# Patient Record
Sex: MALE | Race: WHITE | NOT HISPANIC OR LATINO | URBAN - METROPOLITAN AREA
[De-identification: names, ages, dates, MRNs, and addresses within clinical notes are randomized per-mention and may not be internally consistent; named-entity substitution may affect disease eponyms.]

---

## 2021-04-19 ENCOUNTER — OFFICE VISIT (OUTPATIENT)
Dept: URBAN - METROPOLITAN AREA CLINIC 60 | Facility: CLINIC | Age: 46
End: 2021-04-19

## 2021-04-20 ENCOUNTER — LAB OUTSIDE AN ENCOUNTER (OUTPATIENT)
Dept: URBAN - METROPOLITAN AREA CLINIC 121 | Facility: CLINIC | Age: 46
End: 2021-04-20

## 2021-04-21 LAB — C-REACTIVE PROTEIN, QUANT: (no result)

## 2021-04-23 LAB
C DIFFICILE TOXIN GENE NAA: NEGATIVE
CALPROTECTIN, FECAL: (no result)
GIARDIA LAMBLIA AG, EIA: NEGATIVE
LACTOFERRIN, FECAL, QUANT.: (no result)
Lab: (no result)
OCCULT BLOOD, FECAL, IA: NEGATIVE
OVA + PARASITE EXAM: (no result)
SALMONELLA/SHIGELLA SCREEN: (no result)
WHITE BLOOD CELLS (WBC), STOOL: (no result)

## 2021-05-14 ENCOUNTER — OFFICE VISIT (OUTPATIENT)
Dept: URBAN - METROPOLITAN AREA SURGERY CENTER 4 | Facility: SURGERY CENTER | Age: 46
End: 2021-05-14

## 2021-05-19 LAB — PATHOLOGY (INDENTED REPORT): (no result)

## 2021-05-24 ENCOUNTER — OFFICE VISIT (OUTPATIENT)
Dept: URBAN - METROPOLITAN AREA CLINIC 60 | Facility: CLINIC | Age: 46
End: 2021-05-24

## 2021-07-19 ENCOUNTER — OFFICE VISIT (OUTPATIENT)
Dept: URBAN - METROPOLITAN AREA CLINIC 60 | Facility: CLINIC | Age: 46
End: 2021-07-19

## 2022-07-09 ENCOUNTER — TELEPHONE ENCOUNTER (OUTPATIENT)
Dept: URBAN - METROPOLITAN AREA CLINIC 121 | Facility: CLINIC | Age: 47
End: 2022-07-09

## 2022-07-09 RX ORDER — ATORVASTATIN CALCIUM 20 MG/1
TABLET, FILM COATED ORAL
Refills: 0 | OUTPATIENT
Start: 2021-03-14 | End: 2021-04-19

## 2022-07-09 RX ORDER — ERGOCALCIFEROL (VITAMIN D2) 10 MCG
TABLET ORAL
Refills: 0 | OUTPATIENT
Start: 2021-04-19 | End: 2021-05-24

## 2022-07-09 RX ORDER — CALCIUM CARBONATE 600 MG
TABLET ORAL
Refills: 0 | OUTPATIENT
Start: 2021-04-19 | End: 2021-05-24

## 2022-07-09 RX ORDER — HYDROCODONE BITARTRATE AND ACETAMINOPHEN 5; 325 MG/1; MG/1
TABLET ORAL
Refills: 0 | OUTPATIENT
Start: 2021-01-01 | End: 2021-04-19

## 2022-07-09 RX ORDER — FAMOTIDINE 40 MG/1
ONCE A DAY TABLET, FILM COATED ORAL ONCE A DAY
Refills: 0 | OUTPATIENT
Start: 2021-11-10 | End: 2021-12-13

## 2022-07-09 RX ORDER — FAMOTIDINE 40 MG/1
ONCE A DAY TABLET, FILM COATED ORAL ONCE A DAY
Refills: 1 | OUTPATIENT
Start: 2021-07-19 | End: 2021-11-10

## 2022-07-09 RX ORDER — OMEPRAZOLE MAGNESIUM 20 MG
ONCE A DAY 30MIN BEFORE BREAKFAST CAPSULE,DELAYED RELEASE (ENTERIC COATED) ORAL ONCE A DAY
Refills: 0 | OUTPATIENT
Start: 2021-05-24 | End: 2021-06-18

## 2022-07-09 RX ORDER — ATORVASTATIN CALCIUM 20 MG/1
TABLET, FILM COATED ORAL
Refills: 0 | OUTPATIENT
Start: 2021-04-19 | End: 2021-05-24

## 2022-07-10 ENCOUNTER — TELEPHONE ENCOUNTER (OUTPATIENT)
Dept: URBAN - METROPOLITAN AREA CLINIC 121 | Facility: CLINIC | Age: 47
End: 2022-07-10

## 2022-07-10 RX ORDER — ATORVASTATIN CALCIUM 20 MG/1
TABLET, FILM COATED ORAL ONCE A DAY
Refills: 0 | Status: ACTIVE | COMMUNITY
Start: 2021-05-24

## 2022-07-10 RX ORDER — FAMOTIDINE 40 MG/1
ONCE A DAY TABLET, FILM COATED ORAL ONCE A DAY
Refills: 1 | Status: ACTIVE | COMMUNITY
Start: 2021-07-19

## 2022-07-10 RX ORDER — LIDOCAINE 50 MG/G
TAKE AS DIRECTED CREAM TOPICAL TAKE AS DIRECTED
Refills: 1 | Status: ACTIVE | COMMUNITY
Start: 2021-05-24

## 2022-07-10 RX ORDER — LACTOBACIL 2/BIFIDO 1/S.THERMO 450B CELL
2 ONCE A DAY PACKET (EA) ORAL
Refills: 2 | Status: ACTIVE | COMMUNITY
Start: 2021-04-19

## 2022-07-10 RX ORDER — ERGOCALCIFEROL (VITAMIN D2) 10 MCG
TABLET ORAL ONCE A DAY
Refills: 0 | Status: ACTIVE | COMMUNITY
Start: 2021-05-24

## 2022-07-10 RX ORDER — OMEPRAZOLE 20 MG/1
ONCE A DAY CAPSULE, DELAYED RELEASE ORAL ONCE A DAY
Refills: 0 | Status: ACTIVE | COMMUNITY
Start: 2021-06-18

## 2022-07-10 RX ORDER — FAMOTIDINE 40 MG/1
TAKE 1 TABLET BY MOUTH EVERY DAY TABLET, FILM COATED ORAL
Refills: 1 | Status: ACTIVE | COMMUNITY
Start: 2021-12-13

## 2022-07-10 RX ORDER — CALCIUM CARBONATE 600 MG
TABLET ORAL ONCE A DAY
Refills: 0 | Status: ACTIVE | COMMUNITY
Start: 2021-05-24

## 2022-10-28 ENCOUNTER — TELEPHONE ENCOUNTER (OUTPATIENT)
Dept: URBAN - METROPOLITAN AREA CLINIC 63 | Facility: CLINIC | Age: 47
End: 2022-10-28

## 2022-11-03 ENCOUNTER — LAB OUTSIDE AN ENCOUNTER (OUTPATIENT)
Dept: URBAN - METROPOLITAN AREA CLINIC 63 | Facility: CLINIC | Age: 47
End: 2022-11-03

## 2022-11-03 ENCOUNTER — WEB ENCOUNTER (OUTPATIENT)
Dept: URBAN - METROPOLITAN AREA CLINIC 63 | Facility: CLINIC | Age: 47
End: 2022-11-03

## 2022-11-03 ENCOUNTER — OFFICE VISIT (OUTPATIENT)
Dept: URBAN - METROPOLITAN AREA CLINIC 63 | Facility: CLINIC | Age: 47
End: 2022-11-03
Payer: COMMERCIAL

## 2022-11-03 VITALS
HEIGHT: 63 IN | DIASTOLIC BLOOD PRESSURE: 70 MMHG | BODY MASS INDEX: 30.65 KG/M2 | HEART RATE: 68 BPM | OXYGEN SATURATION: 98 % | SYSTOLIC BLOOD PRESSURE: 120 MMHG | TEMPERATURE: 97.3 F | WEIGHT: 173 LBS

## 2022-11-03 DIAGNOSIS — Z87.19 HISTORY OF ULCERATIVE COLITIS: ICD-10-CM

## 2022-11-03 DIAGNOSIS — K20.0 EOSINOPHILIC ESOPHAGITIS: ICD-10-CM

## 2022-11-03 PROCEDURE — 99213 OFFICE O/P EST LOW 20 MIN: CPT | Performed by: INTERNAL MEDICINE

## 2022-11-03 RX ORDER — LIDOCAINE 50 MG/G
TAKE AS DIRECTED CREAM TOPICAL TAKE AS DIRECTED
Refills: 1 | Status: ACTIVE | COMMUNITY
Start: 2021-05-24

## 2022-11-03 RX ORDER — CALCIUM CARBONATE 600 MG
TABLET ORAL ONCE A DAY
Refills: 0 | Status: ACTIVE | COMMUNITY
Start: 2021-05-24

## 2022-11-03 RX ORDER — LORATADINE 10 MG
1 PACKET MIXED WITH 8 OUNCES OF FLUID TABLET,DISINTEGRATING ORAL ONCE A DAY
Status: ACTIVE | COMMUNITY

## 2022-11-03 RX ORDER — PANTOPRAZOLE SODIUM 40 MG/1
1 TABLET TABLET, DELAYED RELEASE ORAL ONCE A DAY
Status: ACTIVE | COMMUNITY

## 2022-11-03 RX ORDER — LACTOBACIL 2/BIFIDO 1/S.THERMO 450B CELL
2 ONCE A DAY PACKET (EA) ORAL
Refills: 2 | Status: ACTIVE | COMMUNITY
Start: 2021-04-19

## 2022-11-03 RX ORDER — ATORVASTATIN CALCIUM 20 MG/1
TABLET, FILM COATED ORAL ONCE A DAY
Refills: 0 | Status: ACTIVE | COMMUNITY
Start: 2021-05-24

## 2022-11-03 RX ORDER — ERGOCALCIFEROL (VITAMIN D2) 10 MCG
TABLET ORAL ONCE A DAY
Refills: 0 | Status: ACTIVE | COMMUNITY
Start: 2021-05-24

## 2022-11-03 RX ORDER — ONDANSETRON 4 MG/1
1 TABLET ON THE TONGUE AND ALLOW TO DISSOLVE TABLET, ORALLY DISINTEGRATING ORAL ONCE A DAY
Status: ACTIVE | COMMUNITY

## 2022-11-03 NOTE — HPI-TODAY'S VISIT:
Anjel is a pleasant 47-year-old male who presents today for follow-up.  History of UC and pouchitis. Previous use of mesalamine, steroid enemas and prednisone.  Developed cushingoid disease.  Previously on azathioprine which eventually caused acute pancreatitis.  Status post restorative pan-proctocolectomy and W pouch.  Reversal of ileostomy. Last visit we recommended elimination diet and proceeding with Pepcid 40 mg daily.Patient underwent repair of incarcerated incisional hernia on 10/1/2022 with Dr. Narayanan.  Presented to the ER on 10/23/2022 with abdominal pain.  Associated constipation.  Evaluated by Dr. Narayanan for suspected ileus secondary to postop anesthesia.  KUB consistent with ileus.  Patient given gentle soapsuds enemas.  Evaluated as well by [facility].  Patient was taken Percocet every 6 hours with minimal relief of his abdominal pain.  Digital rectal exam unrevealing for impaction.  Attempts at NG tube placement failed with patient refusing reattempt.  Patient was given fleets enema and Relistor injection with consideration of flexible pouchoscopy with decompression pending clinical course.  In addition patient was given Reglan as prokinetic agent patient was discharged on Movantik, Metamucil, probiotics and MiraLAX once daily upon discharge  Labs dated 10/24/2022 showed a normal CBC.  PT/INR normal.  Normal CMP.  KUB dated 10/26/2022 showed small bowel distention mildly improved as compared to the prior examination with posterior distal gas suspicious for small bowel obstruction.  Suggestion of small bowel wall thickening in the right mid abdomen  CT abdomen/pelvis with contrast dated 10/23/2022 showed mild nonspecific fat stranding seen within the anterior abdomen along the anterior abdominal wall without evidence of bowel wall thickening or organized fluid collection.  Trace free intraperitoneal gas in the anterior abdomen which may be secondary to recent surgery.  EGD dated 5/14/2021 showed a regular Z-line.  White specked mucosa in the esophagus concerning for eosinophilic microabscesses.  Linear furrows noted in in upper esophagus with rings.  Gastritis.  Duodenitis.  Small hiatal hernia.  Normal second portion and third portion of the duodenum. Pathology demonstrated benign duodenal biopsies.  Body/antrum biopsies negative for H. pylori.  Lower third esophageal biopsies revealed increased intraepithelial eosinophils up to 80 per high-power field.  Upper third esophageal biopsies revealed increased intraepithelial eosinophils up to 40 per high-power field.  Otherwise unremarkable or benign.  Flexible sigmoidoscopy dated 5/14/2021 showed normal rectal pouch.  Nonbleeding internal hemorrhoids. Rectal pouch biopsies revealed fragments of benign intestinal type mucosa with focal mild acute cryptitis no evidence of granulomas or dysplasia  DEXA scan dated 5/7/21 demonstrated osteopenia in the lumbar spine and left femoral neck. Left total hip is normal  Patient states abd cramping has improved with 5-6 loose stools per day.  States has no urgency to have BM .  Patient was started on protonix 40mg daily for heartburn in hospital.

## 2022-11-29 ENCOUNTER — WEB ENCOUNTER (OUTPATIENT)
Dept: URBAN - METROPOLITAN AREA SURGERY CENTER 4 | Facility: SURGERY CENTER | Age: 47
End: 2022-11-29

## 2022-12-02 ENCOUNTER — CLAIMS CREATED FROM THE CLAIM WINDOW (OUTPATIENT)
Dept: URBAN - METROPOLITAN AREA CLINIC 4 | Facility: CLINIC | Age: 47
End: 2022-12-02
Payer: COMMERCIAL

## 2022-12-02 ENCOUNTER — OFFICE VISIT (OUTPATIENT)
Dept: URBAN - METROPOLITAN AREA SURGERY CENTER 4 | Facility: SURGERY CENTER | Age: 47
End: 2022-12-02
Payer: COMMERCIAL

## 2022-12-02 DIAGNOSIS — K31.89 OTHER DISEASES OF STOMACH AND DUODENUM: ICD-10-CM

## 2022-12-02 DIAGNOSIS — K20.0 EOSINOPHILIC ESOPHAGITIS: ICD-10-CM

## 2022-12-02 DIAGNOSIS — K22.9 DISORDER OF ESOPHAGUS: ICD-10-CM

## 2022-12-02 DIAGNOSIS — K63.89 OTHER SPECIFIED DISEASES OF INTESTINE: ICD-10-CM

## 2022-12-02 DIAGNOSIS — K52.3 INDETERMINATE COLITIS: ICD-10-CM

## 2022-12-02 DIAGNOSIS — K64.0 GRADE I INTERNAL HEMORRHOIDS: ICD-10-CM

## 2022-12-02 DIAGNOSIS — K29.70 GASTRITIS: ICD-10-CM

## 2022-12-02 PROCEDURE — 43239 EGD BIOPSY SINGLE/MULTIPLE: CPT | Performed by: INTERNAL MEDICINE

## 2022-12-02 PROCEDURE — 88312 SPECIAL STAINS GROUP 1: CPT | Performed by: PATHOLOGY

## 2022-12-02 PROCEDURE — 88305 TISSUE EXAM BY PATHOLOGIST: CPT | Performed by: PATHOLOGY

## 2022-12-02 PROCEDURE — 88342 IMHCHEM/IMCYTCHM 1ST ANTB: CPT | Performed by: PATHOLOGY

## 2022-12-02 PROCEDURE — 45331 SIGMOIDOSCOPY AND BIOPSY: CPT | Performed by: INTERNAL MEDICINE

## 2022-12-02 RX ORDER — LACTOBACIL 2/BIFIDO 1/S.THERMO 450B CELL
2 ONCE A DAY PACKET (EA) ORAL
Refills: 2 | Status: ACTIVE | COMMUNITY
Start: 2021-04-19

## 2022-12-02 RX ORDER — ATORVASTATIN CALCIUM 20 MG/1
TABLET, FILM COATED ORAL ONCE A DAY
Refills: 0 | Status: ACTIVE | COMMUNITY
Start: 2021-05-24

## 2022-12-02 RX ORDER — LIDOCAINE 50 MG/G
TAKE AS DIRECTED CREAM TOPICAL TAKE AS DIRECTED
Refills: 1 | Status: ACTIVE | COMMUNITY
Start: 2021-05-24

## 2022-12-02 RX ORDER — PANTOPRAZOLE SODIUM 40 MG/1
1 TABLET TABLET, DELAYED RELEASE ORAL ONCE A DAY
Status: ACTIVE | COMMUNITY

## 2022-12-02 RX ORDER — ERGOCALCIFEROL (VITAMIN D2) 10 MCG
TABLET ORAL ONCE A DAY
Refills: 0 | Status: ACTIVE | COMMUNITY
Start: 2021-05-24

## 2022-12-02 RX ORDER — ONDANSETRON 4 MG/1
1 TABLET ON THE TONGUE AND ALLOW TO DISSOLVE TABLET, ORALLY DISINTEGRATING ORAL ONCE A DAY
Status: ACTIVE | COMMUNITY

## 2022-12-02 RX ORDER — CALCIUM CARBONATE 600 MG
TABLET ORAL ONCE A DAY
Refills: 0 | Status: ACTIVE | COMMUNITY
Start: 2021-05-24

## 2022-12-02 RX ORDER — LORATADINE 10 MG
1 PACKET MIXED WITH 8 OUNCES OF FLUID TABLET,DISINTEGRATING ORAL ONCE A DAY
Status: ACTIVE | COMMUNITY

## 2022-12-06 PROBLEM — 235746007 INDETERMINATE COLITIS: Status: ACTIVE | Noted: 2022-12-06

## 2022-12-06 PROBLEM — 4556007 GASTRITIS: Status: ACTIVE | Noted: 2022-12-06

## 2022-12-21 ENCOUNTER — OFFICE VISIT (OUTPATIENT)
Dept: URBAN - METROPOLITAN AREA CLINIC 63 | Facility: CLINIC | Age: 47
End: 2022-12-21
Payer: COMMERCIAL

## 2022-12-21 ENCOUNTER — LAB OUTSIDE AN ENCOUNTER (OUTPATIENT)
Dept: URBAN - METROPOLITAN AREA CLINIC 63 | Facility: CLINIC | Age: 47
End: 2022-12-21

## 2022-12-21 ENCOUNTER — ERX REFILL RESPONSE (OUTPATIENT)
Dept: URBAN - METROPOLITAN AREA CLINIC 63 | Facility: CLINIC | Age: 47
End: 2022-12-21

## 2022-12-21 VITALS
TEMPERATURE: 98.6 F | HEIGHT: 63 IN | OXYGEN SATURATION: 98 % | DIASTOLIC BLOOD PRESSURE: 70 MMHG | BODY MASS INDEX: 31.29 KG/M2 | HEART RATE: 70 BPM | SYSTOLIC BLOOD PRESSURE: 124 MMHG | WEIGHT: 176.6 LBS

## 2022-12-21 DIAGNOSIS — K20.0 EOSINOPHILIC ESOPHAGITIS: ICD-10-CM

## 2022-12-21 DIAGNOSIS — K91.850 POUCHITIS: ICD-10-CM

## 2022-12-21 DIAGNOSIS — Z87.19 HISTORY OF ULCERATIVE COLITIS: ICD-10-CM

## 2022-12-21 PROBLEM — 235599003: Status: ACTIVE | Noted: 2022-11-03

## 2022-12-21 PROCEDURE — 99213 OFFICE O/P EST LOW 20 MIN: CPT | Performed by: INTERNAL MEDICINE

## 2022-12-21 RX ORDER — ERGOCALCIFEROL (VITAMIN D2) 10 MCG
TABLET ORAL ONCE A DAY
Refills: 0 | Status: ACTIVE | COMMUNITY
Start: 2021-05-24

## 2022-12-21 RX ORDER — MESALAMINE 1000 MG/1
1 SUPPOSITORY AT BEDTIME SUPPOSITORY RECTAL ONCE A DAY
Qty: 30 | Refills: 2 | OUTPATIENT
Start: 2022-12-21 | End: 2023-03-21

## 2022-12-21 RX ORDER — ATORVASTATIN CALCIUM 20 MG/1
TABLET, FILM COATED ORAL ONCE A DAY
Refills: 0 | Status: ACTIVE | COMMUNITY
Start: 2021-05-24

## 2022-12-21 RX ORDER — PANTOPRAZOLE SODIUM 40 MG/1
1 TABLET TABLET, DELAYED RELEASE ORAL ONCE A DAY
Status: ACTIVE | COMMUNITY

## 2022-12-21 RX ORDER — PANTOPRAZOLE SODIUM 20 MG/1
TAKE 1 TABLET BY MOUTH EVERY DAY TABLET, DELAYED RELEASE ORAL
Qty: 90 TABLET | Refills: 0 | OUTPATIENT

## 2022-12-21 RX ORDER — CALCIUM CARBONATE 600 MG
TABLET ORAL ONCE A DAY
Refills: 0 | Status: ACTIVE | COMMUNITY
Start: 2021-05-24

## 2022-12-21 RX ORDER — PANTOPRAZOLE SODIUM 20 MG/1
1 TABLET TABLET, DELAYED RELEASE ORAL ONCE A DAY
Qty: 30 | Refills: 0 | OUTPATIENT

## 2022-12-21 RX ORDER — MESALAMINE 1.2 G/1
3TABS DAILY TABLET, DELAYED RELEASE ORAL ONCE A DAY
Qty: 90 TABLET | Refills: 2 | OUTPATIENT
Start: 2022-12-21 | End: 2023-03-21

## 2022-12-21 RX ORDER — LIDOCAINE 50 MG/G
TAKE AS DIRECTED CREAM TOPICAL TAKE AS DIRECTED
Refills: 1 | Status: ACTIVE | COMMUNITY
Start: 2021-05-24

## 2022-12-21 RX ORDER — LACTOBACIL 2/BIFIDO 1/S.THERMO 450B CELL
2 ONCE A DAY PACKET (EA) ORAL
Refills: 2 | Status: ACTIVE | COMMUNITY
Start: 2021-04-19

## 2022-12-21 RX ORDER — PANTOPRAZOLE SODIUM 20 MG/1
1 TABLET TABLET, DELAYED RELEASE ORAL ONCE A DAY
Qty: 30 | Refills: 0 | OUTPATIENT
Start: 2022-12-21

## 2022-12-21 NOTE — HPI-TODAY'S VISIT:
Anjel is a pleasant 47-year-old male who presents today for a procedure follow-up.  History of UC and pouchitis. Previous use of mesalamine, steroid enemas and prednisone.  Developed cushingoid disease.  Previously on azathioprine which eventually caused acute pancreatitis.  Status post restorative pan-proctocolectomy and W pouch.  Reversal of ileostomy. Patient underwent repair of incarcerated incisional hernia on 10/21/2022 with Dr. Narayanan.  Patient had noted abdominal cramping had improved with 5-6 loose stools per day last visit.  No fecal urgency.  Started on Protonix 40 mg daily for heartburn while he was in the hospital.  Recommend he continue MiraLAX daily, probiotic and Citrucel daily.  Recommend he follow-up with PCP regarding bone density.  In addition it was recommended he continue PPI and elimination diet  EGD dated 12/2/2022 showed a regular Z-line.  Esophageal plaques were found consistent with candidiasis.  Gastritis.  Normal duodenum.  Flex sig dated 12/2/2022 showed 2 nodules found in the rectal pouch.  Biopsies taken.  Appears more consistent with accessory fold versus external protrusion.  Pouch otherwise normal.  Afferent ileal limb normal.  Nonbleeding internal hemorrhoids.  Repeat flexible sigmoidoscopy in 1 year for surveillance.  Consider MRI pelvis as outpatient.  PATH :Chronic active ileitis of pouch and EOE; No evidence of Barretts, or HPylori Labs dated 10/24/2022 showed a normal CBC.  PT/INR normal.  Normal CMP.  KUB dated 10/26/2022 showed small bowel distention mildly improved as compared to the prior examination with posterior distal gas suspicious for small bowel obstruction.  Suggestion of small bowel wall thickening in the right mid abdomen  CT abdomen/pelvis with contrast dated 10/23/2022 showed mild nonspecific fat stranding seen within the anterior abdomen along the anterior abdominal wall without evidence of bowel wall thickening or organized fluid collection.  Trace free intraperitoneal gas in the anterior abdomen which may be secondary to recent surgery.  EGD dated 5/14/2021 showed a regular Z-line.  White specked mucosa in the esophagus concerning for eosinophilic microabscesses.  Linear furrows noted in in upper esophagus with rings.  Gastritis.  Duodenitis.  Small hiatal hernia.  Normal second portion and third portion of the duodenum. Pathology demonstrated benign duodenal biopsies.  Body/antrum biopsies negative for H. pylori.  Lower third esophageal biopsies revealed increased intraepithelial eosinophils up to 80 per high-power field.  Upper third esophageal biopsies revealed increased intraepithelial eosinophils up to 40 per high-power field.  Otherwise unremarkable or benign.  Flexible sigmoidoscopy dated 5/14/2021 showed normal rectal pouch.  Nonbleeding internal hemorrhoids. Rectal pouch biopsies revealed fragments of benign intestinal type mucosa with focal mild acute cryptitis no evidence of granulomas or dysplasia  DEXA scan dated 5/7/21 demonstrated osteopenia in the lumbar spine and left femoral neck. Left total hip is normal  Patient feeling well denies any abd pain, rectal bleeding. hearburn, dysphagia or wt loss. Occasional straining with BM using miralax

## 2022-12-28 ENCOUNTER — TELEPHONE ENCOUNTER (OUTPATIENT)
Dept: URBAN - METROPOLITAN AREA CLINIC 23 | Facility: CLINIC | Age: 47
End: 2022-12-28

## 2023-01-06 ENCOUNTER — TELEPHONE ENCOUNTER (OUTPATIENT)
Dept: URBAN - METROPOLITAN AREA CLINIC 63 | Facility: CLINIC | Age: 48
End: 2023-01-06

## 2023-02-02 ENCOUNTER — TELEPHONE ENCOUNTER (OUTPATIENT)
Dept: URBAN - METROPOLITAN AREA CLINIC 63 | Facility: CLINIC | Age: 48
End: 2023-02-02

## 2023-03-27 ENCOUNTER — OFFICE VISIT (OUTPATIENT)
Dept: URBAN - METROPOLITAN AREA CLINIC 60 | Facility: CLINIC | Age: 48
End: 2023-03-27

## 2023-04-03 ENCOUNTER — OFFICE VISIT (OUTPATIENT)
Dept: URBAN - METROPOLITAN AREA CLINIC 60 | Facility: CLINIC | Age: 48
End: 2023-04-03
Payer: COMMERCIAL

## 2023-04-03 ENCOUNTER — LAB OUTSIDE AN ENCOUNTER (OUTPATIENT)
Dept: URBAN - METROPOLITAN AREA CLINIC 60 | Facility: CLINIC | Age: 48
End: 2023-04-03

## 2023-04-03 VITALS
TEMPERATURE: 98.1 F | HEART RATE: 81 BPM | OXYGEN SATURATION: 99 % | DIASTOLIC BLOOD PRESSURE: 76 MMHG | HEIGHT: 63 IN | SYSTOLIC BLOOD PRESSURE: 126 MMHG | WEIGHT: 181 LBS | BODY MASS INDEX: 32.07 KG/M2

## 2023-04-03 DIAGNOSIS — K45.8 OTHER SPECIFIED ABDOMINAL HERNIA WITHOUT OBSTRUCTION OR GANGRENE: ICD-10-CM

## 2023-04-03 DIAGNOSIS — K20.0 EOSINOPHILIC ESOPHAGITIS: ICD-10-CM

## 2023-04-03 DIAGNOSIS — K91.850 POUCHITIS: ICD-10-CM

## 2023-04-03 DIAGNOSIS — Z87.19 HISTORY OF ULCERATIVE COLITIS: ICD-10-CM

## 2023-04-03 PROCEDURE — 99213 OFFICE O/P EST LOW 20 MIN: CPT | Performed by: INTERNAL MEDICINE

## 2023-04-03 RX ORDER — PANTOPRAZOLE SODIUM 40 MG/1
1 TABLET TABLET, DELAYED RELEASE ORAL ONCE A DAY
Status: ACTIVE | COMMUNITY

## 2023-04-03 RX ORDER — ERGOCALCIFEROL (VITAMIN D2) 10 MCG
TABLET ORAL ONCE A DAY
Refills: 0 | Status: ACTIVE | COMMUNITY
Start: 2021-05-24

## 2023-04-03 RX ORDER — LIDOCAINE 50 MG/G
TAKE AS DIRECTED CREAM TOPICAL TAKE AS DIRECTED
Refills: 1 | Status: ACTIVE | COMMUNITY
Start: 2021-05-24

## 2023-04-03 RX ORDER — CALCIUM CARBONATE 600 MG
TABLET ORAL ONCE A DAY
Refills: 0 | Status: ACTIVE | COMMUNITY
Start: 2021-05-24

## 2023-04-03 RX ORDER — LACTOBACIL 2/BIFIDO 1/S.THERMO 450B CELL
2 ONCE A DAY PACKET (EA) ORAL
Refills: 2 | Status: ACTIVE | COMMUNITY
Start: 2021-04-19

## 2023-04-03 RX ORDER — ONDANSETRON 4 MG/1
1 TABLET ON THE TONGUE AND ALLOW TO DISSOLVE TABLET, ORALLY DISINTEGRATING ORAL ONCE A DAY
Status: ACTIVE | COMMUNITY

## 2023-04-03 RX ORDER — ATORVASTATIN CALCIUM 20 MG/1
TABLET, FILM COATED ORAL ONCE A DAY
Refills: 0 | Status: ACTIVE | COMMUNITY
Start: 2021-05-24

## 2023-04-03 NOTE — HPI-TODAY'S VISIT:
Anjel is a pleasant 47-year-old male who presents today for a follow-up.  History of UC and pouchitis. Developed cushingoid disease previously.  Previously on azathioprine which eventually caused acute pancreatitis.  Status post restorative pan-proctocolectomy and W pouch.  Reversal of ileostomy. Patient underwent repair of incarcerated incisional hernia on 10/21/2022 with Dr. Narayanan.  Last visit noted occasional straining with bowel movements using MiraLAX. Maintained on Lialda 3.6 g daily and mesalamine suppositories nightly.  Decreased to pantoprazole 20 mg once daily  MRI pelvis with and without contrast and December 2022 showed postsurgical changes of colectomy and pouch creation.  No evidence of acute pouchitis.  Probable internal hernia involving a 15 to 20 cm segment of borderline dilated small bowel approximately 10 cm proximal to the pouch.  Small bowel proximally is nondilated.  The fat stranding described on the 10/23/2022 CT performed at [facility] was within the corresponding small bowel mesentery and has since resolved  EGD dated 12/2/2022 showed a regular Z-line.  Esophageal plaques were found consistent with candidiasis.  Gastritis.  Normal duodenum. No significant antrum/body abnormality.  Esophageal biopsy showed squamous mucosa with marked basal cell hyperplasia and numerous intraepithelial eosinophils, ranging from 40 to 60 per high-power field.  Consistent with EOE.  No evidence of infectious organisms or Danielle's esophagus  Flex sig dated 12/2/2022 showed 2 nodules found in the rectal pouch.  Biopsies taken.  Appears more consistent with accessory fold versus external protrusion.  Pouch otherwise normal.  Afferent ileal limb normal.  Nonbleeding internal hemorrhoids.  Repeat flexible sigmoidoscopy in 1 year for surveillance.  Consider MRI pelvis as outpatient. 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  Negative for infectious organisms, dysplasia or malignancy.  Prominent mucosal lymphoid aggregates.  Negative for dysplasia or malignancy.  Labs dated 10/24/2022 showed a normal CBC.  PT/INR normal.  Normal CMP.  KUB dated 10/26/2022 showed small bowel distention mildly improved as compared to the prior examination with posterior distal gas suspicious for small bowel obstruction.  Suggestion of small bowel wall thickening in the right mid abdomen  CT abdomen/pelvis with contrast dated 10/23/2022 showed mild nonspecific fat stranding seen within the anterior abdomen along the anterior abdominal wall without evidence of bowel wall thickening or organized fluid collection.  Trace free intraperitoneal gas in the anterior abdomen which may be secondary to recent surgery.  DEXA scan dated 5/7/21 demonstrated osteopenia in the lumbar spine and left femoral neck. Left total hip is normal  Patient recently seen by DR [facility] where he had pouchgram which was normal. Recommended MRE. Taking mesalamine 3.6 gm and supp daily.  Takes  probiotic daily.  Occasional rectal bleeding felt to be hemorrhoidal.   DEnies rectal pain , heartburn, dysphagia or wt loss Still taking protonix 20mg daily .

## 2023-04-14 ENCOUNTER — ERX REFILL RESPONSE (OUTPATIENT)
Dept: URBAN - METROPOLITAN AREA CLINIC 63 | Facility: CLINIC | Age: 48
End: 2023-04-14

## 2023-04-14 RX ORDER — PANTOPRAZOLE 20 MG/1
TAKE 1 TABLET BY MOUTH EVERY DAY TABLET, DELAYED RELEASE ORAL
Qty: 90 TABLET | Refills: 0 | OUTPATIENT

## 2023-04-14 RX ORDER — MESALAMINE 1000 MG/1
UNWRAP AND INSERT 1 SUPPOSITORY RECTALLY EVERY DAY AT BEDTIME SUPPOSITORY RECTAL
Qty: 30 SUPPOSITORY | Refills: 0 | OUTPATIENT

## 2023-04-19 ENCOUNTER — TELEPHONE ENCOUNTER (OUTPATIENT)
Dept: URBAN - METROPOLITAN AREA CLINIC 60 | Facility: CLINIC | Age: 48
End: 2023-04-19

## 2023-04-19 RX ORDER — MESALAMINE 1.2 G/1
4 TABLETS WITH MEAL TABLET, DELAYED RELEASE ORAL ONCE A DAY
Qty: 120 TABLET | Refills: 2 | OUTPATIENT
Start: 2023-04-20 | End: 2023-07-19

## 2023-04-19 RX ORDER — LIDOCAINE 50 MG/G
TAKE AS DIRECTED CREAM TOPICAL TAKE AS DIRECTED
Refills: 1 | Status: ACTIVE | COMMUNITY
Start: 2021-05-24

## 2023-04-19 RX ORDER — PANTOPRAZOLE 20 MG/1
TAKE 1 TABLET BY MOUTH EVERY DAY TABLET, DELAYED RELEASE ORAL
Qty: 90 TABLET | Refills: 0 | Status: ACTIVE | COMMUNITY

## 2023-04-19 RX ORDER — PANTOPRAZOLE SODIUM 40 MG/1
1 TABLET TABLET, DELAYED RELEASE ORAL ONCE A DAY
Status: ACTIVE | COMMUNITY

## 2023-04-19 RX ORDER — LACTOBACIL 2/BIFIDO 1/S.THERMO 450B CELL
2 ONCE A DAY PACKET (EA) ORAL
Refills: 2 | Status: ACTIVE | COMMUNITY
Start: 2021-04-19

## 2023-04-19 RX ORDER — ATORVASTATIN CALCIUM 20 MG/1
TABLET, FILM COATED ORAL ONCE A DAY
Refills: 0 | Status: ACTIVE | COMMUNITY
Start: 2021-05-24

## 2023-04-19 RX ORDER — CALCIUM CARBONATE 600 MG
TABLET ORAL ONCE A DAY
Refills: 0 | Status: ACTIVE | COMMUNITY
Start: 2021-05-24

## 2023-04-19 RX ORDER — ERGOCALCIFEROL (VITAMIN D2) 10 MCG
TABLET ORAL ONCE A DAY
Refills: 0 | Status: ACTIVE | COMMUNITY
Start: 2021-05-24

## 2023-04-19 RX ORDER — ONDANSETRON 4 MG/1
1 TABLET ON THE TONGUE AND ALLOW TO DISSOLVE TABLET, ORALLY DISINTEGRATING ORAL ONCE A DAY
Status: ACTIVE | COMMUNITY

## 2023-04-19 RX ORDER — MESALAMINE 1000 MG/1
UNWRAP AND INSERT 1 SUPPOSITORY RECTALLY EVERY DAY AT BEDTIME SUPPOSITORY RECTAL
Qty: 30 SUPPOSITORY | Refills: 0 | Status: ACTIVE | COMMUNITY

## 2023-04-20 ENCOUNTER — TELEPHONE ENCOUNTER (OUTPATIENT)
Dept: URBAN - METROPOLITAN AREA CLINIC 63 | Facility: CLINIC | Age: 48
End: 2023-04-20

## 2023-06-26 ENCOUNTER — OFFICE VISIT (OUTPATIENT)
Dept: URBAN - METROPOLITAN AREA CLINIC 60 | Facility: CLINIC | Age: 48
End: 2023-06-26

## 2023-08-01 ENCOUNTER — ERX REFILL RESPONSE (OUTPATIENT)
Dept: URBAN - METROPOLITAN AREA CLINIC 60 | Facility: CLINIC | Age: 48
End: 2023-08-01

## 2023-08-01 RX ORDER — MESALAMINE 1.2 G/1
TAKE 4 TABLETS BY MOUTH EVERY DAY WITH MEAL TABLET, DELAYED RELEASE ORAL
Qty: 120 TABLET | Refills: 0 | OUTPATIENT

## 2023-08-15 ENCOUNTER — ERX REFILL RESPONSE (OUTPATIENT)
Dept: URBAN - METROPOLITAN AREA CLINIC 63 | Facility: CLINIC | Age: 48
End: 2023-08-15

## 2023-08-15 RX ORDER — PANTOPRAZOLE 20 MG/1
TAKE 1 TABLET BY MOUTH EVERY DAY TABLET, DELAYED RELEASE ORAL
Qty: 90 TABLET | Refills: 0 | OUTPATIENT

## 2023-11-10 ENCOUNTER — ERX REFILL RESPONSE (OUTPATIENT)
Dept: URBAN - METROPOLITAN AREA CLINIC 63 | Facility: CLINIC | Age: 48
End: 2023-11-10

## 2023-11-10 RX ORDER — PANTOPRAZOLE 20 MG/1
TAKE 1 TABLET BY MOUTH EVERY DAY TABLET, DELAYED RELEASE ORAL
Qty: 90 TABLET | Refills: 0 | OUTPATIENT

## 2023-11-22 ENCOUNTER — OFFICE VISIT (OUTPATIENT)
Dept: URBAN - METROPOLITAN AREA CLINIC 63 | Facility: CLINIC | Age: 48
End: 2023-11-22
Payer: COMMERCIAL

## 2023-11-22 ENCOUNTER — LAB OUTSIDE AN ENCOUNTER (OUTPATIENT)
Dept: URBAN - METROPOLITAN AREA CLINIC 63 | Facility: CLINIC | Age: 48
End: 2023-11-22

## 2023-11-22 VITALS
WEIGHT: 173 LBS | BODY MASS INDEX: 30.65 KG/M2 | HEIGHT: 63 IN | DIASTOLIC BLOOD PRESSURE: 80 MMHG | OXYGEN SATURATION: 97 % | SYSTOLIC BLOOD PRESSURE: 120 MMHG | TEMPERATURE: 97 F | RESPIRATION RATE: 20 BRPM | HEART RATE: 71 BPM

## 2023-11-22 DIAGNOSIS — K45.8 OTHER SPECIFIED ABDOMINAL HERNIA WITHOUT OBSTRUCTION OR GANGRENE: ICD-10-CM

## 2023-11-22 DIAGNOSIS — K20.0 EOSINOPHILIC ESOPHAGITIS: ICD-10-CM

## 2023-11-22 DIAGNOSIS — Z87.19 HISTORY OF ULCERATIVE COLITIS: ICD-10-CM

## 2023-11-22 PROCEDURE — 99214 OFFICE O/P EST MOD 30 MIN: CPT | Performed by: INTERNAL MEDICINE

## 2023-11-22 RX ORDER — CALCIUM CARBONATE 600 MG
TABLET ORAL ONCE A DAY
Refills: 0 | Status: ACTIVE | COMMUNITY
Start: 2021-05-24

## 2023-11-22 RX ORDER — LIDOCAINE 50 MG/G
TAKE AS DIRECTED CREAM TOPICAL TAKE AS DIRECTED
Refills: 1 | Status: ACTIVE | COMMUNITY
Start: 2021-05-24

## 2023-11-22 RX ORDER — ATORVASTATIN CALCIUM 20 MG/1
TABLET, FILM COATED ORAL ONCE A DAY
Refills: 0 | Status: ACTIVE | COMMUNITY
Start: 2021-05-24

## 2023-11-22 RX ORDER — ERGOCALCIFEROL (VITAMIN D2) 10 MCG
TABLET ORAL ONCE A DAY
Refills: 0 | Status: ACTIVE | COMMUNITY
Start: 2021-05-24

## 2023-11-22 RX ORDER — MESALAMINE 1.2 G/1
TAKE 4 TABLETS BY MOUTH EVERY DAY WITH MEAL TABLET, DELAYED RELEASE ORAL
Qty: 120 TABLET | Refills: 0 | Status: ACTIVE | COMMUNITY

## 2023-11-22 RX ORDER — PANTOPRAZOLE 20 MG/1
TAKE 1 TABLET BY MOUTH EVERY DAY TABLET, DELAYED RELEASE ORAL
Qty: 90 TABLET | Refills: 0 | Status: ACTIVE | COMMUNITY

## 2023-11-22 RX ORDER — PANTOPRAZOLE SODIUM 40 MG/1
1 TABLET TABLET, DELAYED RELEASE ORAL ONCE A DAY
Status: ACTIVE | COMMUNITY

## 2023-11-22 NOTE — HPI-TODAY'S VISIT:
Anjel is a pleasant 48-year-old male who presents today for a follow-up.  History of UC and pouchitis. Developed cushingoid disease previously.  Previously on azathioprine which eventually caused acute pancreatitis.  Status post restorative pan-proctocolectomy and W pouch.  Reversal of ileostomy.  Patient underwent repair of incarcerated incisional hernia on 10/21/2022 with Dr. Narayanan.   Maintained on Lialda 2.4gm  daily and mesalamine suppositories nightly. Patient seen by the [facility] where he had pouchogram which was normal.  Recommended MR enterography.  Taking daily probiotic.   Occasional rectal bleeding felt to be hemorrhoidal.  Still taking Protonix 20 mg daily.   MRI pelvis with and without contrast and December 2022 showed postsurgical changes of colectomy and pouch creation.  No evidence of acute pouchitis.  Probable internal hernia involving a 15 to 20 cm segment of borderline dilated small bowel approximately 10 cm proximal to the pouch.  Small bowel proximally is nondilated.  The fat stranding described on the 10/23/2022 CT performed at [facility] was within the corresponding small bowel mesentery and has since resolved  EGD dated 12/2/2022 showed a regular Z-line.  Esophageal plaques were found consistent with candidiasis.  Gastritis.  Normal duodenum. No significant antrum/body abnormality.  Esophageal biopsy showed squamous mucosa with marked basal cell hyperplasia and numerous intraepithelial eosinophils, ranging from 40 to 60 per high-power field.  Consistent with EOE.  No evidence of infectious organisms or Danielle's esophagus  Flex sig dated 12/2/2022 showed 2 nodules found in the rectal pouch.  Biopsies taken.  Appears more consistent with accessory fold versus external protrusion.  Pouch otherwise normal.  Afferent ileal limb normal.  Nonbleeding internal hemorrhoids.  Repeat flexible sigmoidoscopy in 1 year for surveillance.  Consider MRI pelvis as outpatient. 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  Negative for infectious organisms, dysplasia or malignancy.  Prominent mucosal lymphoid aggregates.  Negative for dysplasia or malignancy.  Labs dated 10/24/2022 showed a normal CBC.  PT/INR normal.  Normal CMP. KUB dated 10/26/2022 showed small bowel distention mildly improved as compared to the prior examination with posterior distal gas suspicious for small bowel obstruction.  Suggestion of small bowel wall thickening in the right mid abdomen  CT abdomen/pelvis with contrast dated 10/23/2022 showed mild nonspecific fat stranding seen within the anterior abdomen along the anterior abdominal wall without evidence of bowel wall thickening or organized fluid collection.  Trace free intraperitoneal gas in the anterior abdomen which may be secondary to recent surgery. DEXA scan dated 5/7/21 demonstrated osteopenia in the lumbar spine and left femoral neck. Left total hip is normal  Patient doing well denies any urgency, rectal bleeding. Occasional rectal irriation diet related.Takes recticare with benefit.  MRE 4/2023 revealed no evidence of obstruction . Patient takes pantoprazole 20mg daily with benefit.

## 2023-12-12 ENCOUNTER — ERX REFILL RESPONSE (OUTPATIENT)
Dept: URBAN - METROPOLITAN AREA CLINIC 60 | Facility: CLINIC | Age: 48
End: 2023-12-12

## 2023-12-12 RX ORDER — MESALAMINE 1.2 G/1
TAKE 4 TABLETS BY MOUTH EVERY DAY WITH MEAL TABLET, DELAYED RELEASE ORAL
Qty: 120 TABLET | Refills: 0 | OUTPATIENT

## 2023-12-14 ENCOUNTER — ERX REFILL RESPONSE (OUTPATIENT)
Dept: URBAN - METROPOLITAN AREA CLINIC 63 | Facility: CLINIC | Age: 48
End: 2023-12-14

## 2023-12-14 RX ORDER — MESALAMINE 1.2 G/1
TAKE 4 TABLETS BY MOUTH DAILY WITH A MEAL TABLET, DELAYED RELEASE ORAL
Qty: 360 TABLET | Refills: 0 | OUTPATIENT

## 2023-12-27 ENCOUNTER — OFFICE VISIT (OUTPATIENT)
Dept: URBAN - METROPOLITAN AREA SURGERY CENTER 4 | Facility: SURGERY CENTER | Age: 48
End: 2023-12-27
Payer: COMMERCIAL

## 2023-12-27 ENCOUNTER — CLAIMS CREATED FROM THE CLAIM WINDOW (OUTPATIENT)
Dept: URBAN - METROPOLITAN AREA CLINIC 4 | Facility: CLINIC | Age: 48
End: 2023-12-27
Payer: COMMERCIAL

## 2023-12-27 DIAGNOSIS — K62.6 ULCER OF RECTUM: ICD-10-CM

## 2023-12-27 DIAGNOSIS — K91.850 RECTAL POUCHITIS: ICD-10-CM

## 2023-12-27 DIAGNOSIS — K51.90 ULCERATIVE COLITIS: ICD-10-CM

## 2023-12-27 DIAGNOSIS — K63.89 OTHER SPECIFIED DISEASES OF INTESTINE: ICD-10-CM

## 2023-12-27 PROCEDURE — 00811 ANES LWR INTST NDSC NOS: CPT | Performed by: NURSE ANESTHETIST, CERTIFIED REGISTERED

## 2023-12-27 PROCEDURE — 45331 SIGMOIDOSCOPY AND BIOPSY: CPT | Performed by: INTERNAL MEDICINE

## 2023-12-27 PROCEDURE — 88305 TISSUE EXAM BY PATHOLOGIST: CPT | Performed by: PATHOLOGY

## 2023-12-27 RX ORDER — ATORVASTATIN CALCIUM 20 MG/1
TABLET, FILM COATED ORAL ONCE A DAY
Refills: 0 | Status: ACTIVE | COMMUNITY
Start: 2021-05-24

## 2023-12-27 RX ORDER — LIDOCAINE 50 MG/G
TAKE AS DIRECTED CREAM TOPICAL TAKE AS DIRECTED
Refills: 1 | Status: ACTIVE | COMMUNITY
Start: 2021-05-24

## 2023-12-27 RX ORDER — CALCIUM CARBONATE 600 MG
TABLET ORAL ONCE A DAY
Refills: 0 | Status: ACTIVE | COMMUNITY
Start: 2021-05-24

## 2023-12-27 RX ORDER — ERGOCALCIFEROL (VITAMIN D2) 10 MCG
TABLET ORAL ONCE A DAY
Refills: 0 | Status: ACTIVE | COMMUNITY
Start: 2021-05-24

## 2023-12-27 RX ORDER — PANTOPRAZOLE SODIUM 40 MG/1
1 TABLET TABLET, DELAYED RELEASE ORAL ONCE A DAY
Status: ACTIVE | COMMUNITY

## 2023-12-27 RX ORDER — MESALAMINE 1.2 G/1
TAKE 4 TABLETS BY MOUTH DAILY WITH A MEAL TABLET, DELAYED RELEASE ORAL
Qty: 360 TABLET | Refills: 0 | Status: ACTIVE | COMMUNITY

## 2023-12-27 RX ORDER — PANTOPRAZOLE 20 MG/1
TAKE 1 TABLET BY MOUTH EVERY DAY TABLET, DELAYED RELEASE ORAL
Qty: 90 TABLET | Refills: 0 | Status: ACTIVE | COMMUNITY

## 2024-01-12 ENCOUNTER — OFFICE VISIT (OUTPATIENT)
Dept: URBAN - METROPOLITAN AREA CLINIC 63 | Facility: CLINIC | Age: 49
End: 2024-01-12
Payer: COMMERCIAL

## 2024-01-12 ENCOUNTER — LAB OUTSIDE AN ENCOUNTER (OUTPATIENT)
Dept: URBAN - METROPOLITAN AREA CLINIC 63 | Facility: CLINIC | Age: 49
End: 2024-01-12

## 2024-01-12 ENCOUNTER — TELEPHONE ENCOUNTER (OUTPATIENT)
Dept: URBAN - METROPOLITAN AREA CLINIC 63 | Facility: CLINIC | Age: 49
End: 2024-01-12

## 2024-01-12 VITALS
WEIGHT: 175 LBS | OXYGEN SATURATION: 98 % | HEART RATE: 83 BPM | HEIGHT: 63 IN | TEMPERATURE: 97.3 F | DIASTOLIC BLOOD PRESSURE: 86 MMHG | BODY MASS INDEX: 31.01 KG/M2 | SYSTOLIC BLOOD PRESSURE: 156 MMHG

## 2024-01-12 DIAGNOSIS — K20.0 EOSINOPHILIC ESOPHAGITIS: ICD-10-CM

## 2024-01-12 DIAGNOSIS — K45.8 OTHER SPECIFIED ABDOMINAL HERNIA WITHOUT OBSTRUCTION OR GANGRENE: ICD-10-CM

## 2024-01-12 DIAGNOSIS — K91.850 POUCHITIS: ICD-10-CM

## 2024-01-12 DIAGNOSIS — Z87.19 HISTORY OF ULCERATIVE COLITIS: ICD-10-CM

## 2024-01-12 PROCEDURE — 99214 OFFICE O/P EST MOD 30 MIN: CPT | Performed by: PHYSICIAN ASSISTANT

## 2024-01-12 RX ORDER — ERGOCALCIFEROL (VITAMIN D2) 10 MCG
TABLET ORAL ONCE A DAY
Refills: 0 | COMMUNITY
Start: 2021-05-24

## 2024-01-12 RX ORDER — ATORVASTATIN CALCIUM 20 MG/1
TABLET, FILM COATED ORAL ONCE A DAY
Refills: 0 | COMMUNITY
Start: 2021-05-24

## 2024-01-12 RX ORDER — LIDOCAINE 50 MG/G
TAKE AS DIRECTED CREAM TOPICAL TAKE AS DIRECTED
Refills: 1 | COMMUNITY
Start: 2021-05-24

## 2024-01-12 RX ORDER — CIPROFLOXACIN 500 MG/1
1 TABLET TABLET, FILM COATED ORAL
Qty: 20 TABLET | Refills: 0 | OUTPATIENT
Start: 2024-01-12 | End: 2024-01-22

## 2024-01-12 RX ORDER — PANTOPRAZOLE 20 MG/1
TAKE 1 TABLET BY MOUTH EVERY DAY TABLET, DELAYED RELEASE ORAL
Qty: 90 TABLET | Refills: 0 | COMMUNITY

## 2024-01-12 RX ORDER — CALCIUM CARBONATE 600 MG
TABLET ORAL ONCE A DAY
Refills: 0 | COMMUNITY
Start: 2021-05-24

## 2024-01-12 RX ORDER — LACTOBACIL 2/BIFIDO 1/S.THERMO 900B CELL
AS DIRECTED PACKET (EA) ORAL ONCE DAILY
Qty: 30 | Refills: 2 | OUTPATIENT
Start: 2024-01-12 | End: 2024-04-11

## 2024-01-12 RX ORDER — PANTOPRAZOLE SODIUM 40 MG/1
1 TABLET TABLET, DELAYED RELEASE ORAL ONCE A DAY
COMMUNITY

## 2024-01-12 RX ORDER — METRONIDAZOLE 500 MG/1
1 TABLET TABLET ORAL THREE TIMES A DAY
Qty: 30 TABLET | Refills: 0 | OUTPATIENT
Start: 2024-01-12 | End: 2024-01-22

## 2024-01-12 RX ORDER — MESALAMINE 1.2 G/1
TAKE 4 TABLETS BY MOUTH DAILY WITH A MEAL TABLET, DELAYED RELEASE ORAL
Qty: 360 TABLET | Refills: 0 | COMMUNITY

## 2024-01-12 NOTE — HPI-TODAY'S VISIT:
Anjel is a pleasant 48-year-old male who presents today for a procedure follow-up.  History of UC and pouchitis. Developed cushingoid disease previously.  Previously on azathioprine which eventually caused acute pancreatitis.  Status post restorative pan-proctocolectomy and W pouch.  Reversal of ileostomy. Patient underwent repair of incarcerated incisional hernia on 10/21/2022 with Dr. Narayanan.   Maintained on Lialda 3.6 g daily and mesalamine suppositories nightly. Patient seen by the [facility] where he had pouchogram which was normal.  Last visit noted occasional rectal irritation which is diet related.  Taking RectiCare with benefit.  Compliant with pantoprazole 20 mg daily.  Flexible sigmoidoscopy dated 12/27/2023 showed a few ulcers biopsied in the rectal pouch.  Exam normal.  Repeat flexible sigmoidoscopy in 4-6 weeks for surveillance.  Small punctate ulcers in the afferent limb of the ileum. No significant abnormality noted on the ileal biopsies.  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  Negative for infectious organisms, dysplasia or malignancy.  This histologic findings in both pouch and anal transition zone/rectal cuff may represent concurrent pouchitis and cuffitis.  Clinical correlation is recommended  Labs dated 8/15/2023 showed glucose 111.  Alk phos 37 but otherwise normal LFTs.  Normal renal function.  MRI enterography dated 4/21/2023 showed stable postsurgical changes of colectomy and pouch creation without evidence of acute pouchitis, enteritis or acute bowel obstruction  MRI pelvis with and without contrast in December 2022 showed postsurgical changes of colectomy and pouch creation.  No evidence of acute pouchitis.  Probable internal hernia involving a 15 to 20 cm segment of borderline dilated small bowel approximately 10 cm proximal to the pouch.  Small bowel proximally is nondilated.  The fat stranding described on the 10/23/2022 CT performed at [facility] was within the corresponding small bowel mesentery and has since resolved  EGD dated 12/2/2022 showed a regular Z-line.  Esophageal plaques were found consistent with candidiasis.  Gastritis.  Normal duodenum. No significant antrum/body abnormality.  Esophageal biopsy showed squamous mucosa with marked basal cell hyperplasia and numerous intraepithelial eosinophils, ranging from 40 to 60 per high-power field.  Consistent with EOE.  No evidence of infectious organisms or Danielle's esophagus  Flex sig dated 12/2/2022 showed 2 nodules found in the rectal pouch.  Biopsies taken.  Appears more consistent with accessory fold versus external protrusion.  Pouch otherwise normal.  Afferent ileal limb normal.  Nonbleeding internal hemorrhoids.  Repeat flexible sigmoidoscopy in 1 year for surveillance.  Consider MRI pelvis as outpatient. 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  Negative for infectious organisms, dysplasia or malignancy.  Prominent mucosal lymphoid aggregates.  Negative for dysplasia or malignancy.  Labs dated 10/24/2022 showed a normal CBC.  PT/INR normal.  Normal CMP.  KUB dated 10/26/2022 showed small bowel distention mildly improved as compared to the prior examination with posterior distal gas suspicious for small bowel obstruction.  Suggestion of small bowel wall thickening in the right mid abdomen  CT abdomen/pelvis with contrast dated 10/23/2022 showed mild nonspecific fat stranding seen within the anterior abdomen along the anterior abdominal wall without evidence of bowel wall thickening or organized fluid collection.  Trace free intraperitoneal gas in the anterior abdomen which may be secondary to recent surgery.  DEXA scan dated 5/7/21 demonstrated osteopenia in the lumbar spine and left femoral neck. Left total hip is normal   No issues after procedure.  Notes 6-8 daily bowel movements without issues of constipation or diarrhea. Mentions increased urgency of the stools. Intermittent lower abdominal discomfort which improves BM. Currently on po and topical mesalamine. Compliant with pantoprazole 20 mg qd. Denies nausea, vomiting, heartburn, reflux, dysphagia, odynophagia, hematemesis, coffee-ground emesis, change in bowel habits, abnormal weight loss or melena. Occasional BRBPR when wiping.  Denies family history of colon cancer or colon polyps.  No other GI complaints at this time

## 2024-01-22 ENCOUNTER — LAB OUTSIDE AN ENCOUNTER (OUTPATIENT)
Dept: URBAN - METROPOLITAN AREA CLINIC 63 | Facility: CLINIC | Age: 49
End: 2024-01-22

## 2024-01-22 ENCOUNTER — TELEPHONE ENCOUNTER (OUTPATIENT)
Dept: URBAN - METROPOLITAN AREA CLINIC 63 | Facility: CLINIC | Age: 49
End: 2024-01-22

## 2024-01-23 LAB
A/G RATIO: 2.2
ALBUMIN: 4.8
ALKALINE PHOSPHATASE: 41
ALT (SGPT): 47
AST (SGOT): 44
BASO (ABSOLUTE): 0
BASOS: 1
BILIRUBIN, TOTAL: 0.4
BUN/CREATININE RATIO: 8
BUN: 8
C-REACTIVE PROTEIN, QUANT: <1
CALCIUM: 9.8
CARBON DIOXIDE, TOTAL: 23
CHLORIDE: 104
CREATININE: 0.95
EGFR: 98
EOS (ABSOLUTE): 0.1
EOS: 1
GLOBULIN, TOTAL: 2.2
GLUCOSE: 86
HEMATOCRIT: 42.2
HEMATOLOGY COMMENTS:: (no result)
HEMOGLOBIN: 14.6
IMMATURE CELLS: (no result)
IMMATURE GRANS (ABS): 0
IMMATURE GRANULOCYTES: 0
LYMPHS (ABSOLUTE): 1.6
LYMPHS: 22
MCH: 30.5
MCHC: 34.6
MCV: 88
MONOCYTES(ABSOLUTE): 0.6
MONOCYTES: 9
NEUTROPHILS (ABSOLUTE): 4.6
NEUTROPHILS: 67
NRBC: (no result)
PLATELETS: 250
POTASSIUM: 4.5
PROTEIN, TOTAL: 7
RBC: 4.78
RDW: 12.9
SODIUM: 141
WBC: 6.9

## 2024-02-13 ENCOUNTER — FLEX SIG (OUTPATIENT)
Dept: URBAN - METROPOLITAN AREA SURGERY CENTER 4 | Facility: SURGERY CENTER | Age: 49
End: 2024-02-13
Payer: COMMERCIAL

## 2024-02-13 ENCOUNTER — LAB (OUTPATIENT)
Dept: URBAN - METROPOLITAN AREA CLINIC 4 | Facility: CLINIC | Age: 49
End: 2024-02-13
Payer: COMMERCIAL

## 2024-02-13 VITALS — HEIGHT: 63 IN

## 2024-02-13 DIAGNOSIS — Z87.19 PERSONAL HISTORY OF OTHER DISEASES OF THE DIGESTIVE SYSTEM: ICD-10-CM

## 2024-02-13 DIAGNOSIS — K63.89 OTHER SPECIFIED DISEASES OF INTESTINE: ICD-10-CM

## 2024-02-13 PROCEDURE — 88305 TISSUE EXAM BY PATHOLOGIST: CPT | Performed by: PATHOLOGY

## 2024-02-13 PROCEDURE — 44386 ENDOSCOPY BOWEL POUCH/BIOP: CPT | Performed by: INTERNAL MEDICINE

## 2024-02-13 RX ORDER — PANTOPRAZOLE SODIUM 40 MG/1
1 TABLET TABLET, DELAYED RELEASE ORAL ONCE A DAY
COMMUNITY

## 2024-02-13 RX ORDER — LIDOCAINE 50 MG/G
TAKE AS DIRECTED CREAM TOPICAL TAKE AS DIRECTED
Refills: 1 | COMMUNITY
Start: 2021-05-24

## 2024-02-13 RX ORDER — LACTOBACIL 2/BIFIDO 1/S.THERMO 900B CELL
AS DIRECTED PACKET (EA) ORAL ONCE DAILY
Qty: 30 | Refills: 2 | Status: ACTIVE | COMMUNITY
Start: 2024-01-12 | End: 2024-04-11

## 2024-02-13 RX ORDER — ERGOCALCIFEROL (VITAMIN D2) 10 MCG
TABLET ORAL ONCE A DAY
Refills: 0 | COMMUNITY
Start: 2021-05-24

## 2024-02-13 RX ORDER — MESALAMINE 1.2 G/1
TAKE 4 TABLETS BY MOUTH DAILY WITH A MEAL TABLET, DELAYED RELEASE ORAL
Qty: 360 TABLET | Refills: 0 | COMMUNITY

## 2024-02-13 RX ORDER — PANTOPRAZOLE 20 MG/1
TAKE 1 TABLET BY MOUTH EVERY DAY TABLET, DELAYED RELEASE ORAL
Qty: 90 TABLET | Refills: 0 | Status: ACTIVE | COMMUNITY

## 2024-02-13 RX ORDER — CALCIUM CARBONATE 600 MG
TABLET ORAL ONCE A DAY
Refills: 0 | COMMUNITY
Start: 2021-05-24

## 2024-02-13 RX ORDER — ATORVASTATIN CALCIUM 20 MG/1
TABLET, FILM COATED ORAL ONCE A DAY
Refills: 0 | COMMUNITY
Start: 2021-05-24

## 2024-03-14 ENCOUNTER — LAB (OUTPATIENT)
Dept: URBAN - METROPOLITAN AREA CLINIC 63 | Facility: CLINIC | Age: 49
End: 2024-03-14

## 2024-03-14 ENCOUNTER — OV EP (OUTPATIENT)
Dept: URBAN - METROPOLITAN AREA CLINIC 63 | Facility: CLINIC | Age: 49
End: 2024-03-14
Payer: COMMERCIAL

## 2024-03-14 VITALS
BODY MASS INDEX: 30.65 KG/M2 | DIASTOLIC BLOOD PRESSURE: 80 MMHG | SYSTOLIC BLOOD PRESSURE: 123 MMHG | OXYGEN SATURATION: 94 % | HEIGHT: 63 IN | WEIGHT: 173 LBS | HEART RATE: 68 BPM | RESPIRATION RATE: 20 BRPM | TEMPERATURE: 97.8 F

## 2024-03-14 DIAGNOSIS — R74.8 ABNORMAL LIVER ENZYMES: ICD-10-CM

## 2024-03-14 DIAGNOSIS — K91.850 POUCHITIS: ICD-10-CM

## 2024-03-14 DIAGNOSIS — K20.0 EOSINOPHILIC ESOPHAGITIS: ICD-10-CM

## 2024-03-14 PROCEDURE — 99214 OFFICE O/P EST MOD 30 MIN: CPT | Performed by: INTERNAL MEDICINE

## 2024-03-14 RX ORDER — PANTOPRAZOLE 20 MG/1
TAKE 1 TABLET BY MOUTH EVERY DAY TABLET, DELAYED RELEASE ORAL
Qty: 90 TABLET | Refills: 0 | Status: ACTIVE | COMMUNITY

## 2024-03-14 RX ORDER — LIDOCAINE 50 MG/G
TAKE AS DIRECTED CREAM TOPICAL TAKE AS DIRECTED
Refills: 1 | Status: ACTIVE | COMMUNITY
Start: 2021-05-24

## 2024-03-14 RX ORDER — PANTOPRAZOLE SODIUM 40 MG/1
1 TABLET TABLET, DELAYED RELEASE ORAL ONCE A DAY
Status: ACTIVE | COMMUNITY

## 2024-03-14 RX ORDER — MESALAMINE 1.2 G/1
TAKE 3 TABLETS DAILY TABLET, DELAYED RELEASE ORAL ONCE A DAY
Qty: 90 | Refills: 3

## 2024-03-14 RX ORDER — MESALAMINE 1.2 G/1
TAKE 4 TABLETS BY MOUTH DAILY WITH A MEAL TABLET, DELAYED RELEASE ORAL
Qty: 360 TABLET | Refills: 0 | Status: ACTIVE | COMMUNITY

## 2024-03-14 RX ORDER — FAMOTIDINE 40 MG/1
1 TABLET DAILY TABLET, FILM COATED ORAL ONCE A DAY
Qty: 30 TABLET | Refills: 3 | OUTPATIENT
Start: 2024-03-14

## 2024-03-14 RX ORDER — ERGOCALCIFEROL (VITAMIN D2) 10 MCG
TABLET ORAL ONCE A DAY
Refills: 0 | Status: ACTIVE | COMMUNITY
Start: 2021-05-24

## 2024-03-14 RX ORDER — LACTOBACIL 2/BIFIDO 1/S.THERMO 900B CELL
AS DIRECTED PACKET (EA) ORAL ONCE DAILY
Qty: 30 | Refills: 2 | Status: ACTIVE | COMMUNITY
Start: 2024-01-12 | End: 2024-04-11

## 2024-03-14 RX ORDER — CALCIUM CARBONATE 600 MG
TABLET ORAL ONCE A DAY
Refills: 0 | Status: ACTIVE | COMMUNITY
Start: 2021-05-24

## 2024-03-14 RX ORDER — ATORVASTATIN CALCIUM 20 MG/1
TABLET, FILM COATED ORAL ONCE A DAY
Refills: 0 | Status: ACTIVE | COMMUNITY
Start: 2021-05-24

## 2024-03-14 NOTE — HPI-TODAY'S VISIT:
Anjel is a pleasant 48-year-old male who presents today for a procedure follow-up. History of UC and pouchitis. Developed cushingoid disease previously. Previously on azathioprine which eventually caused acute pancreatitis. Status post restorative pan-proctocolectomy and W pouch. Reversal of ileostomy. Patient underwent repair of incarcerated incisional hernia on 10/21/2022 with Dr. Narayanan. Maintained on Lialda 3.6 g daily and mesalamine suppositories nightly. Patient seen by the [facility] where he had pouchogram which was normal. Taking RectiCare with benefit.   Compliant with pantoprazole 20 mg daily.  Last visit noted 6-8 daily bowel movements and increased urgency of stools.  Lower abdominal discomfort which improves with bowel movement.  Occasional BRBPR when wiping.  Recommend low residue diet, avoiding NSAIDs and placed on Cipro/Flagyl for 10 days.  In addition recommended VSL #3 Labs dated 1/23/2024 showed normal CRP.  Normal creatinine.  Normal GFR.  Alk phos 41.  AST 44, ALT 47.  Normal hemoglobin.  Normal platelets. Flexible sigmoidoscopy dated 2/13/2024 showed the rectal pouch and terminal ileum normal.  Repeat flexible sigmoidoscopy in 1 year. Rectal pouch biopsy showed no significant abnormality   Flexible sigmoidoscopy dated 12/27/2023 showed a few ulcers biopsied in the rectal pouch. Exam normal. Repeat flexible sigmoidoscopy in 4-6 weeks for surveillance. Small punctate ulcers in the afferent limb of the ileum. No significant abnormality noted on the ileal biopsies. 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 Negative for infectious organisms, dysplasia or malignancy. This histologic findings in both pouch and anal transition zone/rectal cuff may represent concurrent pouchitis and cuffitis. Clinical correlation is recommended   MRI enterography dated 4/21/2023 showed stable postsurgical changes of colectomy and pouch creation without evidence of acute pouchitis, enteritis or acute bowel obstruction MRI pelvis with and without contrast in December 2022 showed postsurgical changes of colectomy and pouch creation. No evidence of acute pouchitis. Probable internal hernia involving a 15 to 20 cm segment of borderline dilated small bowel approximately 10 cm proximal to the pouch. Small bowel proximally is nondilated. The fat stranding described on the 10/23/2022 CT performed at [facility] was within the corresponding small bowel mesentery and has since resolved EGD dated 12/2/2022 showed a regular Z-line. Esophageal plaques were found consistent with candidiasis. Gastritis. Normal duodenum. No significant antrum/body abnormality. Esophageal biopsy showed squamous mucosa with marked basal cell hyperplasia and numerous intraepithelial eosinophils, ranging from 40 to 60 per high-power field. Consistent with EOE. No evidence of infectious organisms or Danielle's esophagus   \  Flex sig dated 12/2/2022 showed 2 nodules found in the rectal pouch. Biopsies taken. Appears more consistent with accessory fold versus external protrusion. Pouch otherwise normal. Afferent ileal limb normal. Nonbleeding internal hemorrhoids. Repeat flexible sigmoidoscopy in 1 year for surveillance. Consider MRI pelvis as outpatient. 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 Negative for infectious organisms, dysplasia or malignancy. Prominent mucosal lymphoid aggregates. Negative for dysplasia or malignancy.     KUB dated 10/26/2022 showed small bowel distention mildly improved as compared to the prior examination with posterior distal gas suspicious for small bowel obstruction. Suggestion of small bowel wall thickening in the right mid abdomen   CT abdomen/pelvis with contrast dated 10/23/2022 showed mild nonspecific fat stranding seen within the anterior abdomen along the anterior abdominal wall without evidence of bowel wall thickening or organized fluid collection. Trace free intraperitoneal gas in the anterior abdomen which may be secondary to recent surgery.   DEXA scan dated 5/7/21 demonstrated osteopenia in the lumbar spine and left femoral neck. Left total hip is normal Paitient states having 7-8 loose stools per day ( nl for him) .Denies rectal bleeding., abd pain, wt loss. Taking Liala 3.6 gm daily and VSL# 3.

## 2024-03-20 LAB
A/G RATIO: 1.7
ALBUMIN: 4.5
ALKALINE PHOSPHATASE: 41
ALT (SGPT): 46
AST (SGOT): 28
BILIRUBIN, TOTAL: 0.8
BUN/CREATININE RATIO: 14
BUN: 13
CALCIUM: 10
CARBON DIOXIDE, TOTAL: 23
CHLORIDE: 105
CREATININE: 0.93
EGFR: 100
GLOBULIN, TOTAL: 2.7
GLUCOSE: 96
POTASSIUM: 4.4
PROTEIN, TOTAL: 7.2
SODIUM: 142

## 2024-05-20 ENCOUNTER — ERX REFILL RESPONSE (OUTPATIENT)
Dept: URBAN - METROPOLITAN AREA CLINIC 63 | Facility: CLINIC | Age: 49
End: 2024-05-20

## 2024-05-20 RX ORDER — PANTOPRAZOLE 20 MG/1
TAKE 1 TABLET BY MOUTH EVERY DAY TABLET, DELAYED RELEASE ORAL
Qty: 90 TABLET | Refills: 0 | OUTPATIENT

## 2024-07-08 ENCOUNTER — TELEPHONE ENCOUNTER (OUTPATIENT)
Dept: URBAN - METROPOLITAN AREA CLINIC 63 | Facility: CLINIC | Age: 49
End: 2024-07-08

## 2024-07-08 RX ORDER — PANTOPRAZOLE 20 MG/1
TAKE 1 TABLET BY MOUTH EVERY DAY TABLET, DELAYED RELEASE ORAL
Qty: 90 TABLET | Refills: 3

## 2024-07-08 RX ORDER — MESALAMINE 1.2 G/1
TAKE 3 TABLETS DAILY TABLET, DELAYED RELEASE ORAL ONCE A DAY
Qty: 90 | Refills: 3
End: 2025-07-03

## 2024-09-12 ENCOUNTER — TELEPHONE ENCOUNTER (OUTPATIENT)
Dept: URBAN - METROPOLITAN AREA CLINIC 63 | Facility: CLINIC | Age: 49
End: 2024-09-12

## 2024-09-24 ENCOUNTER — DASHBOARD ENCOUNTERS (OUTPATIENT)
Age: 49
End: 2024-09-24

## 2024-09-24 ENCOUNTER — LAB OUTSIDE AN ENCOUNTER (OUTPATIENT)
Dept: URBAN - METROPOLITAN AREA CLINIC 60 | Facility: CLINIC | Age: 49
End: 2024-09-24

## 2024-09-24 ENCOUNTER — OFFICE VISIT (OUTPATIENT)
Dept: URBAN - METROPOLITAN AREA CLINIC 60 | Facility: CLINIC | Age: 49
End: 2024-09-24
Payer: COMMERCIAL

## 2024-09-24 ENCOUNTER — TELEPHONE ENCOUNTER (OUTPATIENT)
Dept: URBAN - METROPOLITAN AREA CLINIC 60 | Facility: CLINIC | Age: 49
End: 2024-09-24

## 2024-09-24 VITALS
WEIGHT: 177.4 LBS | HEIGHT: 63 IN | SYSTOLIC BLOOD PRESSURE: 126 MMHG | HEART RATE: 77 BPM | RESPIRATION RATE: 12 BRPM | OXYGEN SATURATION: 97 % | DIASTOLIC BLOOD PRESSURE: 74 MMHG | BODY MASS INDEX: 31.43 KG/M2 | TEMPERATURE: 98.6 F

## 2024-09-24 DIAGNOSIS — Z87.19 HISTORY OF ULCERATIVE COLITIS: ICD-10-CM

## 2024-09-24 DIAGNOSIS — K91.850 POUCHITIS: ICD-10-CM

## 2024-09-24 DIAGNOSIS — R74.8 ABNORMAL LIVER ENZYMES: ICD-10-CM

## 2024-09-24 DIAGNOSIS — K20.0 EOSINOPHILIC ESOPHAGITIS: ICD-10-CM

## 2024-09-24 PROCEDURE — 99204 OFFICE O/P NEW MOD 45 MIN: CPT

## 2024-09-24 RX ORDER — ALENDRONATE SODIUM 70 MG/1
TAKE 1 TABLET BY MOUTH 1 TIME A WEEK 30 MINUTES BEFORE FIRST FOOD OR BEVERAGE OR MEDICINE OF THE DAY WITH WATER TABLET ORAL
Qty: 12 EACH | Refills: 1 | Status: ACTIVE | COMMUNITY

## 2024-09-24 RX ORDER — MESALAMINE 1.2 G/1
TAKE 3 TABLETS BY MOUTH DAILY TABLET, DELAYED RELEASE ORAL
Qty: 270 EACH | Refills: 0 | Status: ACTIVE | COMMUNITY

## 2024-09-24 RX ORDER — ATORVASTATIN CALCIUM 20 MG/1
TAKE 1 TABLET BY MOUTH DAILY TABLET, FILM COATED ORAL
Qty: 90 EACH | Refills: 0 | Status: ACTIVE | COMMUNITY

## 2024-09-24 RX ORDER — PANTOPRAZOLE 20 MG/1
TAKE 1 TABLET BY MOUTH EVERY DAY TABLET, DELAYED RELEASE ORAL
OUTPATIENT

## 2024-09-24 RX ORDER — GABAPENTIN 300 MG/1
TAKE 1 CAPSULE BY MOUTH 1 TIME AT BEDTIME AS NEEDED FOR PAIN CAPSULE ORAL
Qty: 30 EACH | Refills: 2 | Status: ACTIVE | COMMUNITY

## 2024-09-24 RX ORDER — MESALAMINE 1.2 G/1
TAKE 3 TABLETS BY MOUTH DAILY TABLET, DELAYED RELEASE ORAL
OUTPATIENT

## 2024-09-24 RX ORDER — PANTOPRAZOLE 20 MG/1
TAKE 1 TABLET BY MOUTH EVERY DAY TABLET, DELAYED RELEASE ORAL
Qty: 90 TABLET | Refills: 3 | Status: ACTIVE | COMMUNITY

## 2024-09-24 RX ORDER — ERGOCALCIFEROL (VITAMIN D2) 10 MCG
TABLET ORAL ONCE A DAY
Refills: 0 | Status: ACTIVE | COMMUNITY
Start: 2021-05-24

## 2024-09-24 RX ORDER — CYCLOBENZAPRINE HYDROCHLORIDE 5 MG/1
TAKE 1 TABLET BY MOUTH DAILY AS NEEDED FOR PAIN TABLET, FILM COATED ORAL
Qty: 20 EACH | Refills: 0 | Status: ACTIVE | COMMUNITY

## 2024-09-24 NOTE — HPI-TODAY'S VISIT:
This is a pleasant 49-year-old male who presents today for follow-up. He is a previous patient of Dr. Mcclendon. History of UC, pouchitis, eosinophilic esophagitis. Status post restorative pan-proctocolectomy and W pouch in 1998. Reversal of ileostomy. Patient no longer has ulcerative colitis, but has intermittent reoccurences of pouchitis. Developed cushingoid disease previously. Previously on azathioprine which eventually caused acute pancreatitis. Patient underwent repair of incarcerated incisional hernia on 10/21/2022 with Dr. Narayanan. Patient was seen by the [facility], where he had pouchogram which was normal.   Today, he presents in good general state. He is due for repeat flexible sigmoidoscopy. Last flexible sigmoidoscopy dated 2/13/2024 with Dr. Mcclendon showed the rectal pouch and terminal ileum normal. Rectal pouch biopsy showed no significant abnormality. Repeat flexible sigmoidoscopy was recommended in 1 year. He has good efficacy with pantoprazole 20 mg daily. Maintained on Lialda 3.6 g daily. Reports intermittent rectal bleeding that is related hemorrhoids. Taking RectiCare for hemorrhoids with benefit. Since his pan-proctocolectomy and W pouch in 1998, he reports frequent loose stools throughout the day, which he regulates through diet.   Currently has no additional GI complaints, questions, concerns. Denies melena, hematochezia, nausea/vomiting, hematemesis, dysphagia, reflux, abdominal pain, unintentional weight loss. Denies a history of stroke, heart attack, pacemaker, defibrillator, stents, blood thinners, COPD, asthma, sleep apnea, chronic kidney disease, diabetes, seizures.

## 2024-09-24 NOTE — HPI-PREVIOUS LABS
Labs dated 1/23/2024 showed normal CRP. Normal creatinine. Normal GFR. Alk phos 41. AST 44, ALT 47. Normal hemoglobin. Normal platelets.

## 2024-09-24 NOTE — HPI-PREVIOUS IMAGING
MRI enterography dated 4/21/2023 showed stable postsurgical changes of colectomy and pouch creation without evidence of acute pouchitis, enteritis or acute bowel obstruction  MRI pelvis with and without contrast in December 2022 showed postsurgical changes of colectomy and pouch creation. No evidence of acute pouchitis. Probable internal hernia involving a 15 to 20 cm segment of borderline dilated small bowel approximately 10 cm proximal to the pouch. Small bowel proximally is nondilated. The fat stranding described on the 10/23/2022 CT performed at [facility] was within the corresponding small bowel mesentery and has since resolved  KUB dated 10/26/2022 showed small bowel distention mildly improved as compared to the prior examination with posterior distal gas suspicious for small bowel obstruction. Suggestion of small bowel wall thickening in the right mid abdomen  CT abdomen/pelvis with contrast dated 10/23/2022 showed mild nonspecific fat stranding seen within the anterior abdomen along the anterior abdominal wall without evidence of bowel wall thickening or organized fluid collection. Trace free intraperitoneal gas in the anterior abdomen which may be secondary to recent surgery.  DEXA scan dated 5/7/21 demonstrated osteopenia in the lumbar spine and left femoral neck. Left total hip is normal Paitient states having 7-8 loose stools per day ( nl for him) .Denies rectal bleeding., abd pain, wt loss. Taking Liala 3.6 gm daily and VSL# 3.

## 2024-09-24 NOTE — HPI-PREVIOUS PROCEDURES
Flexible sigmoidoscopy dated 2/13/2024 showed the rectal pouch and terminal ileum normal. Repeat flexible sigmoidoscopy in 1 year. Rectal pouch biopsy showed no significant abnormality  Flexible sigmoidoscopy dated 12/27/2023 showed a few ulcers biopsied in the rectal pouch. Exam normal. Repeat flexible sigmoidoscopy in 4-6 weeks for surveillance. Small punctate ulcers in the afferent limb of the ileum. No significant abnormality noted on the ileal biopsies. 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 Negative for infectious organisms, dysplasia or malignancy. This histologic findings in both pouch and anal transition zone/rectal cuff may represent concurrent pouchitis and cuffitis. Clinical correlation is recommended  EGD dated 12/2/2022 showed a regular Z-line. Esophageal plaques were found consistent with candidiasis. Gastritis. Normal duodenum. No significant antrum/body abnormality. Esophageal biopsy showed squamous mucosa with marked basal cell hyperplasia and numerous intraepithelial eosinophils, ranging from 40 to 60 per high-power field. Consistent with EOE. No evidence of infectious organisms or Danielle's esophagus  Flex sig dated 12/2/2022 showed 2 nodules found in the rectal pouch. Biopsies taken. Appears more consistent with accessory fold versus external protrusion. Pouch otherwise normal. Afferent ileal limb normal. Nonbleeding internal hemorrhoids. Repeat flexible sigmoidoscopy in 1 year for surveillance. Consider MRI pelvis as outpatient. 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 Negative for infectious organisms, dysplasia or malignancy. Prominent mucosal lymphoid aggregates. Negative for dysplasia or malignancy.

## 2024-10-21 ENCOUNTER — TELEPHONE ENCOUNTER (OUTPATIENT)
Dept: URBAN - METROPOLITAN AREA CLINIC 64 | Facility: CLINIC | Age: 49
End: 2024-10-21

## 2025-01-06 NOTE — PHYSICAL EXAM CARDIOVASCULAR:
no edema, no murmurs, regular rate and rhythm
The Caprini score indicates that this patient is at high risk for a VTE event (score =8).    Ssurgical patients in this group will benefit from both pharmacologic prophylaxis and intermittent compression devices.    The surgical team will determine the balance between VTE risk and bleeding risk, and other clinical considerations.

## 2025-01-20 ENCOUNTER — OFFICE VISIT (OUTPATIENT)
Dept: URBAN - METROPOLITAN AREA SURGERY CENTER 4 | Facility: SURGERY CENTER | Age: 50
End: 2025-01-20

## 2025-01-27 ENCOUNTER — CLAIMS CREATED FROM THE CLAIM WINDOW (OUTPATIENT)
Dept: URBAN - METROPOLITAN AREA SURGERY CENTER 4 | Facility: SURGERY CENTER | Age: 50
End: 2025-01-27
Payer: COMMERCIAL

## 2025-01-27 ENCOUNTER — CLAIMS CREATED FROM THE CLAIM WINDOW (OUTPATIENT)
Dept: URBAN - METROPOLITAN AREA CLINIC 4 | Facility: CLINIC | Age: 50
End: 2025-01-27
Payer: COMMERCIAL

## 2025-01-27 DIAGNOSIS — K91.850 POUCHITIS: ICD-10-CM

## 2025-01-27 DIAGNOSIS — Z98.0 INTESTINAL BYPASS AND ANASTOMOSIS STATUS: ICD-10-CM

## 2025-01-27 DIAGNOSIS — K63.89 OTHER SPECIFIED DISEASES OF INTESTINE: ICD-10-CM

## 2025-01-27 DIAGNOSIS — K64.0 GRADE I HEMORRHOIDS: ICD-10-CM

## 2025-01-27 PROCEDURE — 45331 SIGMOIDOSCOPY AND BIOPSY: CPT | Performed by: INTERNAL MEDICINE

## 2025-01-27 PROCEDURE — 44386 ENDOSCOPY BOWEL POUCH/BIOP: CPT | Performed by: INTERNAL MEDICINE

## 2025-01-27 PROCEDURE — 88305 TISSUE EXAM BY PATHOLOGIST: CPT | Performed by: PATHOLOGY

## 2025-01-27 PROCEDURE — 00811 ANES LWR INTST NDSC NOS: CPT | Performed by: NURSE ANESTHETIST, CERTIFIED REGISTERED

## 2025-01-27 RX ORDER — MESALAMINE 1.2 G/1
TAKE 3 TABLETS BY MOUTH DAILY TABLET, DELAYED RELEASE ORAL
Status: ACTIVE | COMMUNITY

## 2025-01-27 RX ORDER — ERGOCALCIFEROL (VITAMIN D2) 10 MCG
TABLET ORAL ONCE A DAY
Refills: 0 | Status: ACTIVE | COMMUNITY
Start: 2021-05-24

## 2025-01-27 RX ORDER — ALENDRONATE SODIUM 70 MG/1
TAKE 1 TABLET BY MOUTH 1 TIME A WEEK 30 MINUTES BEFORE FIRST FOOD OR BEVERAGE OR MEDICINE OF THE DAY WITH WATER TABLET ORAL
Qty: 12 EACH | Refills: 1 | Status: ACTIVE | COMMUNITY

## 2025-01-27 RX ORDER — GABAPENTIN 300 MG/1
TAKE 1 CAPSULE BY MOUTH 1 TIME AT BEDTIME AS NEEDED FOR PAIN CAPSULE ORAL
Qty: 30 EACH | Refills: 2 | Status: ACTIVE | COMMUNITY

## 2025-01-27 RX ORDER — PANTOPRAZOLE 20 MG/1
TAKE 1 TABLET BY MOUTH EVERY DAY TABLET, DELAYED RELEASE ORAL
Status: ACTIVE | COMMUNITY

## 2025-01-27 RX ORDER — ATORVASTATIN CALCIUM 20 MG/1
TAKE 1 TABLET BY MOUTH DAILY TABLET, FILM COATED ORAL
Qty: 90 EACH | Refills: 0 | Status: ACTIVE | COMMUNITY

## 2025-01-27 RX ORDER — CYCLOBENZAPRINE HYDROCHLORIDE 5 MG/1
TAKE 1 TABLET BY MOUTH DAILY AS NEEDED FOR PAIN TABLET, FILM COATED ORAL
Qty: 20 EACH | Refills: 0 | Status: ACTIVE | COMMUNITY

## 2025-02-05 ENCOUNTER — OFFICE VISIT (OUTPATIENT)
Dept: URBAN - METROPOLITAN AREA CLINIC 60 | Facility: CLINIC | Age: 50
End: 2025-02-05

## 2025-02-10 ENCOUNTER — ERX REFILL RESPONSE (OUTPATIENT)
Dept: URBAN - METROPOLITAN AREA CLINIC 63 | Facility: CLINIC | Age: 50
End: 2025-02-10

## 2025-02-10 ENCOUNTER — OFFICE VISIT (OUTPATIENT)
Dept: URBAN - METROPOLITAN AREA CLINIC 60 | Facility: CLINIC | Age: 50
End: 2025-02-10
Payer: COMMERCIAL

## 2025-02-10 VITALS
HEART RATE: 71 BPM | HEIGHT: 63 IN | SYSTOLIC BLOOD PRESSURE: 124 MMHG | BODY MASS INDEX: 31.36 KG/M2 | TEMPERATURE: 98 F | DIASTOLIC BLOOD PRESSURE: 72 MMHG | WEIGHT: 177 LBS

## 2025-02-10 DIAGNOSIS — R74.8 ABNORMAL LIVER ENZYMES: ICD-10-CM

## 2025-02-10 DIAGNOSIS — K20.0 EOSINOPHILIC ESOPHAGITIS: ICD-10-CM

## 2025-02-10 DIAGNOSIS — K51.80 OTHER ULCERATIVE COLITIS WITHOUT COMPLICATION: ICD-10-CM

## 2025-02-10 PROBLEM — 64766004: Status: ACTIVE | Noted: 2025-02-10

## 2025-02-10 PROCEDURE — 99214 OFFICE O/P EST MOD 30 MIN: CPT

## 2025-02-10 RX ORDER — CYCLOBENZAPRINE HYDROCHLORIDE 5 MG/1
TAKE 1 TABLET BY MOUTH DAILY AS NEEDED FOR PAIN TABLET, FILM COATED ORAL
Qty: 20 EACH | Refills: 0 | Status: ACTIVE | COMMUNITY

## 2025-02-10 RX ORDER — ALENDRONATE SODIUM 70 MG/1
TAKE 1 TABLET BY MOUTH 1 TIME A WEEK 30 MINUTES BEFORE FIRST FOOD OR BEVERAGE OR MEDICINE OF THE DAY WITH WATER TABLET ORAL
Qty: 12 EACH | Refills: 1 | Status: ACTIVE | COMMUNITY

## 2025-02-10 RX ORDER — MESALAMINE 1.2 G/1
TAKE 3 TABLETS BY MOUTH DAILY TABLET, DELAYED RELEASE ORAL
Qty: 90 TABLET | Refills: 6 | OUTPATIENT

## 2025-02-10 RX ORDER — MESALAMINE 1.2 G/1
TAKE 3 TABLETS BY MOUTH DAILY TABLET, DELAYED RELEASE ORAL
Status: ACTIVE | COMMUNITY

## 2025-02-10 RX ORDER — ATORVASTATIN CALCIUM 20 MG/1
TAKE 1 TABLET BY MOUTH DAILY TABLET, FILM COATED ORAL
Qty: 90 EACH | Refills: 0 | Status: ACTIVE | COMMUNITY

## 2025-02-10 RX ORDER — GABAPENTIN 300 MG/1
TAKE 1 CAPSULE BY MOUTH 1 TIME AT BEDTIME AS NEEDED FOR PAIN CAPSULE ORAL
Qty: 30 EACH | Refills: 2 | Status: ACTIVE | COMMUNITY

## 2025-02-10 RX ORDER — PANTOPRAZOLE 20 MG/1
TAKE 1 TABLET BY MOUTH EVERY DAY TABLET, DELAYED RELEASE ORAL
Status: ACTIVE | COMMUNITY

## 2025-02-10 RX ORDER — ERGOCALCIFEROL (VITAMIN D2) 10 MCG
TABLET ORAL ONCE A DAY
Refills: 0 | Status: ACTIVE | COMMUNITY
Start: 2021-05-24

## 2025-02-10 RX ORDER — MESALAMINE 1.2 G/1
TAKE 3 TABLETS BY MOUTH DAILY TABLET, DELAYED RELEASE ORAL
OUTPATIENT

## 2025-02-10 NOTE — HPI-PREVIOUS IMAGING
MRI enterography dated 4/21/2023 showed stable postsurgical changes of colectomy and pouch creation without evidence of acute pouchitis, enteritis or acute bowel obstruction  MRI pelvis with and without contrast in December 2022 showed postsurgical changes of colectomy and pouch creation. No evidence of acute pouchitis. Probable internal hernia involving a 15 to 20 cm segment of borderline dilated small bowel approximately 10 cm proximal to the pouch. Small bowel proximally is nondilated. The fat stranding described on the 10/23/2022 CT performed at [facility] was within the corresponding small bowel mesentery and has since resolved  KUB dated 10/26/2022 showed small bowel distention mildly improved as compared to the prior examination with posterior distal gas suspicious for small bowel obstruction. Suggestion of small bowel wall thickening in the right mid abdomen  CT abdomen/pelvis with contrast dated 10/23/2022 showed mild nonspecific fat stranding seen within the anterior abdomen along the anterior abdominal wall without evidence of bowel wall thickening or organized fluid collection. Trace free intraperitoneal gas in the anterior abdomen which may be secondary to recent surgery.  DEXA scan dated 5/7/21 demonstrated osteopenia in the lumbar spine and left femoral neck. Left total hip is normal Paitient states having 7-8 loose stools per day ( nl for him) .Denies rectal bleeding., abd pain, wt loss. Taking Liala 3.6 gm daily and VSL# 3.

## 2025-02-10 NOTE — HPI-PREVIOUS PROCEDURES
Flexible sigmoidoscopy 1/2025 by Dr. Carrizales noted examined portion of ileum normal, fibrotic ring felt on MARY ANNE history of ileal pouch anal anastomosis, patent ileal pouch anal anastomosis characterized by healthy-appearing mucosa, no inflammation noted, mild stenosis of ileal pouch anal anastomosis permits passage of pediatric scope easily, grade 1 hemorrhoids.  Noted extended preparation for future procedure and repeat in 1 year.  Rectum biopsy with no significant abnormality.  Flexible sigmoidoscopy dated 2/13/2024 showed the rectal pouch and terminal ileum normal. Repeat flexible sigmoidoscopy in 1 year. Rectal pouch biopsy showed no significant abnormality  Flexible sigmoidoscopy dated 12/27/2023 showed a few ulcers biopsied in the rectal pouch. Exam normal. Repeat flexible sigmoidoscopy in 4-6 weeks for surveillance. Small punctate ulcers in the afferent limb of the ileum. No significant abnormality noted on the ileal biopsies. 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 Negative for infectious organisms, dysplasia or malignancy. This histologic findings in both pouch and anal transition zone/rectal cuff may represent concurrent pouchitis and cuffitis. Clinical correlation is recommended  EGD dated 12/2/2022 showed a regular Z-line. Esophageal plaques were found consistent with candidiasis. Gastritis. Normal duodenum. No significant antrum/body abnormality. Esophageal biopsy showed squamous mucosa with marked basal cell hyperplasia and numerous intraepithelial eosinophils, ranging from 40 to 60 per high-power field. Consistent with EOE. No evidence of infectious organisms or Danielle's esophagus  Flex sig dated 12/2/2022 showed 2 nodules found in the rectal pouch. Biopsies taken. Appears more consistent with accessory fold versus external protrusion. Pouch otherwise normal. Afferent ileal limb normal. Nonbleeding internal hemorrhoids. Repeat flexible sigmoidoscopy in 1 year for surveillance. Consider MRI pelvis as outpatient. 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 Negative for infectious organisms, dysplasia or malignancy. Prominent mucosal lymphoid aggregates. Negative for dysplasia or malignancy.

## 2025-02-10 NOTE — HPI-TODAY'S VISIT:
This is a pleasant 49-year-old male who presents today for follow-up. He is a previous patient of Dr. Mcclendon. Last seen by ADAM Joyce. History of UC, pouchitis, eosinophilic esophagitis. Status post restorative pan-proctocolectomy and W pouch in 1998. Reversal of ileostomy. Patient no longer has ulcerative colitis, but has intermittent reoccurences of pouchitis. Developed cushingoid disease previously. Previously on azathioprine which eventually caused acute pancreatitis. Patient underwent repair of incarcerated incisional hernia on 10/21/2022 with Dr. Narayanan. Patient was seen by the [facility], where he had pouchogram which was normal.   Patient comes in follow-up after flexible sigmoidoscopy.  He is feeling well.  We reviewed the results of today's visit.  Anastomosis with healthy-appearing mucosa, no inflammation noted, biopsies negative.  Mild stenosis that permits passage of pediatric scope easily.  Repeat 1 year.  Patient is happy with these results.  He has daily diarrhea typically 5-8 bowel movements per day without bleeding, fever, chills or abdominal pain.  He states symptoms are the same since he had his surgery.  New symptom of left sided "bump" that comes and goes and is tender when its there.  He states it happened again yesterday and it felt a lot like when he had his previous umbilical hernia incisional hernia in 2022.  Nothing felt today and no tenderness elicited.  He states he is going to give Dr. Narayanan's office a call as he is concerned that he might have the beginnings of another incisional hernia.He has good efficacy with pantoprazole 20 mg daily. Maintained on Lialda 3.6 g daily.    Currently has no additional GI complaints, questions, concerns. Denies melena, hematochezia, nausea/vomiting, hematemesis, dysphagia, reflux, abdominal pain, unintentional weight loss. Denies a history of stroke, heart attack, pacemaker, defibrillator, stents, blood thinners, COPD, asthma, sleep apnea, chronic kidney disease, diabetes, seizures.

## 2025-02-10 NOTE — HPI-PREVIOUS LABS
Labs dated 1/23/2024 showed normal CRP. Normal creatinine. Normal GFR. Alk phos 41. AST 44, ALT 47. Normal hemoglobin. Normal platelets.  Labs 10/1/2024 CBC within normal limits CRP less than 3.0 CMP with slightly elevated AST 37, slightly elevated ALT 57

## 2025-02-17 ENCOUNTER — LAB OUTSIDE AN ENCOUNTER (OUTPATIENT)
Dept: URBAN - METROPOLITAN AREA CLINIC 63 | Facility: CLINIC | Age: 50
End: 2025-02-17

## 2025-02-19 LAB
ALBUMIN: 4.5
ALKALINE PHOSPHATASE: 39
ALT (SGPT): 58
AST (SGOT): 39
BASO (ABSOLUTE): 0
BASOS: 0
BILIRUBIN, TOTAL: 0.5
BUN/CREATININE RATIO: 7
BUN: 7
C-REACTIVE PROTEIN, QUANT: <1
CALCIUM: 9.4
CARBON DIOXIDE, TOTAL: 24
CHLORIDE: 106
CREATININE: 0.95
EGFR: 97
EOS (ABSOLUTE): 0.2
EOS: 3
GLOBULIN, TOTAL: 2.2
GLUCOSE: 80
HEMATOCRIT: 41.1
HEMATOLOGY COMMENTS:: (no result)
HEMOGLOBIN: 14.3
IMMATURE CELLS: (no result)
IMMATURE GRANS (ABS): 0
IMMATURE GRANULOCYTES: 0
LYMPHS (ABSOLUTE): 1.4
LYMPHS: 21
MCH: 30.8
MCHC: 34.8
MCV: 88
MONOCYTES(ABSOLUTE): 0.5
MONOCYTES: 8
NEUTROPHILS (ABSOLUTE): 4.6
NEUTROPHILS: 68
NRBC: (no result)
PLATELETS: 260
POTASSIUM: 4.5
PROTEIN, TOTAL: 6.7
RBC: 4.65
RDW: 12.3
SODIUM: 141
WBC: 6.8

## 2025-08-04 ENCOUNTER — OFFICE VISIT (OUTPATIENT)
Dept: URBAN - METROPOLITAN AREA CLINIC 60 | Facility: CLINIC | Age: 50
End: 2025-08-04
Payer: COMMERCIAL

## 2025-08-04 ENCOUNTER — LAB OUTSIDE AN ENCOUNTER (OUTPATIENT)
Dept: URBAN - METROPOLITAN AREA CLINIC 60 | Facility: CLINIC | Age: 50
End: 2025-08-04

## 2025-08-04 DIAGNOSIS — K20.0 EOSINOPHILIC ESOPHAGITIS: ICD-10-CM

## 2025-08-04 DIAGNOSIS — K51.80 OTHER ULCERATIVE COLITIS WITHOUT COMPLICATION: ICD-10-CM

## 2025-08-04 PROCEDURE — 99214 OFFICE O/P EST MOD 30 MIN: CPT

## 2025-08-04 RX ORDER — PANTOPRAZOLE 20 MG/1
TAKE 1 TABLET BY MOUTH EVERY DAY TABLET, DELAYED RELEASE ORAL
Status: ACTIVE | COMMUNITY

## 2025-08-04 RX ORDER — CYCLOBENZAPRINE HYDROCHLORIDE 5 MG/1
TAKE 1 TABLET BY MOUTH DAILY AS NEEDED FOR PAIN TABLET, FILM COATED ORAL
Qty: 20 EACH | Refills: 0 | Status: ACTIVE | COMMUNITY

## 2025-08-04 RX ORDER — TESTOSTERONE CYPIONATE 200 MG/ML
ADMINISTER 0.5 ML IN THE MUSCLE EVERY 2 WEEKS INJECTION, SOLUTION INTRAMUSCULAR
Qty: 1 MILLILITER | Refills: 0 | Status: ACTIVE | COMMUNITY

## 2025-08-04 RX ORDER — NICOTINE 14MG/24HR
AS DIRECTED PATCH, TRANSDERMAL 24 HOURS TRANSDERMAL
Status: ACTIVE | COMMUNITY

## 2025-08-04 RX ORDER — GABAPENTIN 300 MG/1
TAKE 1 CAPSULE BY MOUTH 1 TIME AT BEDTIME AS NEEDED FOR PAIN CAPSULE ORAL
Qty: 30 EACH | Refills: 2 | Status: ACTIVE | COMMUNITY

## 2025-08-04 RX ORDER — ALENDRONATE SODIUM 70 MG/1
TAKE 1 TABLET BY MOUTH 1 TIME A WEEK 30 MINUTES BEFORE FIRST FOOD OR BEVERAGE OR MEDICINE OF THE DAY WITH WATER TABLET ORAL
Qty: 12 EACH | Refills: 1 | Status: ACTIVE | COMMUNITY

## 2025-08-04 RX ORDER — MESALAMINE 1.2 G/1
TAKE 3 TABLETS BY MOUTH DAILY TABLET, DELAYED RELEASE ORAL
Qty: 90 TABLET | Refills: 6 | Status: ACTIVE | COMMUNITY

## 2025-08-04 RX ORDER — ERGOCALCIFEROL (VITAMIN D2) 10 MCG
TABLET ORAL ONCE A DAY
Refills: 0 | Status: ACTIVE | COMMUNITY
Start: 2021-05-24

## 2025-08-04 RX ORDER — ATORVASTATIN CALCIUM 20 MG/1
TAKE 1 TABLET BY MOUTH DAILY TABLET, FILM COATED ORAL
Qty: 90 EACH | Refills: 0 | Status: ACTIVE | COMMUNITY

## 2025-08-08 ENCOUNTER — ERX REFILL RESPONSE (OUTPATIENT)
Dept: URBAN - METROPOLITAN AREA CLINIC 63 | Facility: CLINIC | Age: 50
End: 2025-08-08

## 2025-08-08 RX ORDER — PANTOPRAZOLE 20 MG/1
TAKE 1 TABLET BY MOUTH EVERY DAY TABLET, DELAYED RELEASE ORAL ONCE A DAY
Qty: 30 | Refills: 5 | OUTPATIENT

## 2025-08-08 RX ORDER — PANTOPRAZOLE 20 MG/1
TAKE 1 TABLET BY MOUTH EVERY DAY TABLET, DELAYED RELEASE ORAL
OUTPATIENT

## 2025-08-19 LAB
ALBUMIN: 4.7
ALKALINE PHOSPHATASE: 40
ALT (SGPT): 34
AST (SGOT): 35
BASO (ABSOLUTE): 0
BASOS: 1
BILIRUBIN, TOTAL: 0.5
BUN/CREATININE RATIO: 11
BUN: 12
C-REACTIVE PROTEIN, QUANT: 2
CALCIUM: 10.2
CARBON DIOXIDE, TOTAL: 21
CHLORIDE: 103
CREATININE: 1.06
EGFR: 84
EOS (ABSOLUTE): 0.1
EOS: 1
GLOBULIN, TOTAL: 2.4
GLUCOSE: 91
HEMATOCRIT: 45.7
HEMATOLOGY COMMENTS:: (no result)
HEMOGLOBIN: 15.2
IMMATURE CELLS: (no result)
IMMATURE GRANS (ABS): 0
IMMATURE GRANULOCYTES: 0
LYMPHS (ABSOLUTE): 1.6
LYMPHS: 24
MCH: 31.1
MCHC: 33.3
MCV: 94
MONOCYTES(ABSOLUTE): 0.5
MONOCYTES: 8
NEUTROPHILS (ABSOLUTE): 4.3
NEUTROPHILS: 66
NRBC: (no result)
PLATELETS: 264
POTASSIUM: 4.6
PROTEIN, TOTAL: 7.1
RBC: 4.88
RDW: 13.3
SODIUM: 141
WBC: 6.5